# Patient Record
Sex: FEMALE | Race: WHITE | Employment: FULL TIME | URBAN - METROPOLITAN AREA
[De-identification: names, ages, dates, MRNs, and addresses within clinical notes are randomized per-mention and may not be internally consistent; named-entity substitution may affect disease eponyms.]

---

## 2017-02-08 RX ORDER — NORGESTIMATE AND ETHINYL ESTRADIOL 0.25-0.035
1 KIT ORAL DAILY
Qty: 1 PACKET | Refills: 3 | Status: SHIPPED | OUTPATIENT
Start: 2017-02-08 | End: 2017-02-09 | Stop reason: ALTCHOICE

## 2017-02-09 RX ORDER — NORGESTIMATE AND ETHINYL ESTRADIOL 7DAYSX3 28
1 KIT ORAL DAILY
Qty: 28 TABLET | Refills: 3 | Status: SHIPPED | OUTPATIENT
Start: 2017-02-09 | End: 2017-04-05 | Stop reason: SDUPTHER

## 2017-04-05 ENCOUNTER — OFFICE VISIT (OUTPATIENT)
Dept: OBGYN CLINIC | Age: 37
End: 2017-04-05
Payer: COMMERCIAL

## 2017-04-05 VITALS
BODY MASS INDEX: 30.78 KG/M2 | RESPIRATION RATE: 18 BRPM | HEIGHT: 70 IN | HEART RATE: 82 BPM | WEIGHT: 215 LBS | DIASTOLIC BLOOD PRESSURE: 76 MMHG | SYSTOLIC BLOOD PRESSURE: 118 MMHG

## 2017-04-05 DIAGNOSIS — Z01.419 ENCOUNTER FOR GYNECOLOGICAL EXAMINATION (GENERAL) (ROUTINE) WITHOUT ABNORMAL FINDINGS: Primary | ICD-10-CM

## 2017-04-05 PROCEDURE — 99395 PREV VISIT EST AGE 18-39: CPT | Performed by: ADVANCED PRACTICE MIDWIFE

## 2017-04-05 RX ORDER — NORGESTIMATE AND ETHINYL ESTRADIOL 7DAYSX3 28
1 KIT ORAL DAILY
Qty: 28 TABLET | Refills: 12 | Status: SHIPPED | OUTPATIENT
Start: 2017-04-05 | End: 2018-04-06 | Stop reason: SDUPTHER

## 2017-04-05 ASSESSMENT — PATIENT HEALTH QUESTIONNAIRE - PHQ9
1. LITTLE INTEREST OR PLEASURE IN DOING THINGS: 0
SUM OF ALL RESPONSES TO PHQ9 QUESTIONS 1 & 2: 0
SUM OF ALL RESPONSES TO PHQ QUESTIONS 1-9: 0
2. FEELING DOWN, DEPRESSED OR HOPELESS: 0

## 2018-04-06 RX ORDER — NORGESTIMATE AND ETHINYL ESTRADIOL 7DAYSX3 28
KIT ORAL
Qty: 28 TABLET | Refills: 2 | Status: SHIPPED | OUTPATIENT
Start: 2018-04-06 | End: 2018-06-27 | Stop reason: SDUPTHER

## 2018-06-27 ENCOUNTER — OFFICE VISIT (OUTPATIENT)
Dept: OBGYN CLINIC | Age: 38
End: 2018-06-27
Payer: COMMERCIAL

## 2018-06-27 VITALS
HEIGHT: 70 IN | HEART RATE: 76 BPM | DIASTOLIC BLOOD PRESSURE: 70 MMHG | RESPIRATION RATE: 16 BRPM | BODY MASS INDEX: 29.35 KG/M2 | WEIGHT: 205 LBS | SYSTOLIC BLOOD PRESSURE: 114 MMHG

## 2018-06-27 DIAGNOSIS — Z01.419 WELL FEMALE EXAM WITH ROUTINE GYNECOLOGICAL EXAM: Primary | ICD-10-CM

## 2018-06-27 DIAGNOSIS — Z12.31 SCREENING MAMMOGRAM, ENCOUNTER FOR: ICD-10-CM

## 2018-06-27 PROCEDURE — 99395 PREV VISIT EST AGE 18-39: CPT | Performed by: ADVANCED PRACTICE MIDWIFE

## 2018-06-27 RX ORDER — NORGESTIMATE AND ETHINYL ESTRADIOL 7DAYSX3 28
KIT ORAL
Qty: 28 TABLET | Refills: 12 | Status: SHIPPED | OUTPATIENT
Start: 2018-06-27 | End: 2019-06-24 | Stop reason: SDUPTHER

## 2018-06-27 RX ORDER — VILAZODONE HYDROCHLORIDE 20 MG/1
TABLET ORAL
Refills: 1 | COMMUNITY
Start: 2018-05-23 | End: 2021-07-27

## 2018-06-27 ASSESSMENT — PATIENT HEALTH QUESTIONNAIRE - PHQ9
SUM OF ALL RESPONSES TO PHQ QUESTIONS 1-9: 0
1. LITTLE INTEREST OR PLEASURE IN DOING THINGS: 0
SUM OF ALL RESPONSES TO PHQ9 QUESTIONS 1 & 2: 0
2. FEELING DOWN, DEPRESSED OR HOPELESS: 0

## 2018-07-23 ENCOUNTER — HOSPITAL ENCOUNTER (OUTPATIENT)
Dept: WOMENS IMAGING | Age: 38
Discharge: HOME | End: 2018-07-25
Payer: COMMERCIAL

## 2018-07-23 DIAGNOSIS — Z12.31 SCREENING MAMMOGRAM, ENCOUNTER FOR: ICD-10-CM

## 2018-07-23 DIAGNOSIS — Z01.419 WELL FEMALE EXAM WITH ROUTINE GYNECOLOGICAL EXAM: ICD-10-CM

## 2018-07-23 PROCEDURE — 77067 SCR MAMMO BI INCL CAD: CPT

## 2019-06-24 RX ORDER — NORGESTIMATE AND ETHINYL ESTRADIOL 7DAYSX3 28
KIT ORAL
Qty: 28 TABLET | Refills: 3 | Status: SHIPPED | OUTPATIENT
Start: 2019-06-24 | End: 2019-09-19 | Stop reason: SDUPTHER

## 2019-06-24 NOTE — TELEPHONE ENCOUNTER
Pt has her annual in august , she needs 3 refills of bc til her appt.  Call into [facility]

## 2019-09-19 ENCOUNTER — OFFICE VISIT (OUTPATIENT)
Dept: OBGYN CLINIC | Age: 39
End: 2019-09-19
Payer: COMMERCIAL

## 2019-09-19 VITALS
DIASTOLIC BLOOD PRESSURE: 78 MMHG | HEART RATE: 80 BPM | SYSTOLIC BLOOD PRESSURE: 114 MMHG | HEIGHT: 70 IN | WEIGHT: 227 LBS | BODY MASS INDEX: 32.5 KG/M2

## 2019-09-19 DIAGNOSIS — Z12.31 ENCOUNTER FOR SCREENING MAMMOGRAM FOR BREAST CANCER: ICD-10-CM

## 2019-09-19 DIAGNOSIS — Z30.41 ENCOUNTER FOR BIRTH CONTROL PILLS MAINTENANCE: ICD-10-CM

## 2019-09-19 DIAGNOSIS — Z01.419 WELL WOMAN EXAM WITH ROUTINE GYNECOLOGICAL EXAM: Primary | ICD-10-CM

## 2019-09-19 PROCEDURE — 99396 PREV VISIT EST AGE 40-64: CPT | Performed by: CLINICAL NURSE SPECIALIST

## 2019-09-19 RX ORDER — NORGESTIMATE AND ETHINYL ESTRADIOL 7DAYSX3 28
KIT ORAL
Qty: 28 TABLET | Refills: 0 | Status: SHIPPED | OUTPATIENT
Start: 2019-09-19 | End: 2019-11-22 | Stop reason: SDUPTHER

## 2019-09-19 NOTE — PROGRESS NOTES
Grandmother         fibrocystic breasts, uterine fibroids    Cancer Maternal Grandmother         lymphoma    Diabetes Maternal Grandmother     Other Mother         fibroids, fibrocystic breasts, DM2 at 61    Thyroid Disease Mother      Social History     Socioeconomic History    Marital status:      Spouse name: Not on file    Number of children: Not on file    Years of education: Not on file    Highest education level: Not on file   Occupational History    Not on file   Social Needs    Financial resource strain: Not on file    Food insecurity:     Worry: Not on file     Inability: Not on file    Transportation needs:     Medical: Not on file     Non-medical: Not on file   Tobacco Use    Smoking status: Never Smoker    Smokeless tobacco: Never Used   Substance and Sexual Activity    Alcohol use: No     Alcohol/week: 0.0 standard drinks     Comment: occ    Drug use: No    Sexual activity: Yes     Partners: Male   Lifestyle    Physical activity:     Days per week: Not on file     Minutes per session: Not on file    Stress: Not on file   Relationships    Social connections:     Talks on phone: Not on file     Gets together: Not on file     Attends Episcopal service: Not on file     Active member of club or organization: Not on file     Attends meetings of clubs or organizations: Not on file     Relationship status: Not on file    Intimate partner violence:     Fear of current or ex partner: Not on file     Emotionally abused: Not on file     Physically abused: Not on file     Forced sexual activity: Not on file   Other Topics Concern    Not on file   Social History Narrative    Not on file       MEDICATIONS:  Current Outpatient Medications   Medication Sig Dispense Refill    Norgestim-Eth Estrad Triphasic 0.18/0.215/0.25 MG-35 MCG TABS TAKE 1 TABLET BY MOUTH ONE TIME A DAY 28 tablet 0    VILAZODONE HCL 20 MG Tablet TAKE 1 TABLET BY MOUTH ONE TIME A DAY WITH FOOD  1     No current facility-administered medications for this visit. ALLERGIES:  Allergies as of 09/19/2019 - Review Complete 09/19/2019   Allergen Reaction Noted    Bee venom Swelling 01/25/2016       Symptoms of decreased mood absent  Symptoms of anhedonia absent    **If either question is answered in a  positive fashion then complete the PHQ9 Scoring Evaluation and make the appropriate referral**      Immunization status: stated as current, but no records available. Gynecologic History:  Menarche: 13 yo  Menopause at [address] [place] yo     Patient's last menstrual period was 08/25/2019 (approximate). Sexually Active: Yes    STD History: No     Permanent Sterilization: No   Reversible Birth Control: Yes pill        Hormone Replacement Exposure: No      Genetic Qualified Family History of Breast, Ovarian , Colon or Uterine Cancer: No     If YES see scanned worksheet. Preventative Health Testing:    Health Maintenance:  Health Maintenance Due   Topic Date Due    Lipid screen  11/18/2018    Diabetes screen  11/18/2018    Flu vaccine (1) 09/01/2019       Date of Last Pap Smear: 1/4/16N/N  Abnormal Pap Smear History: no  Colposcopy History:   Date of Last Mammogram: Na  Date of Last Colonoscopy:   Date of Last Bone Density:      ________________________________________________________________________        REVIEW OF SYSTEMS:    Yes   A minimum of an eleven point review of systems was completed. Review Of Systems (11 point):  Constitutional: No fever, chills or malaise;  No weight change or fatigue  Head and Eyes: No vision, Headache, Dizziness or trauma in last 12 months  ENT ROS: No hearing, Tinnitis, sinus or taste problems  Hematological and Lymphatic ROS:No Lymphoma, Von Willebrand's, Hemophillia or Bleeding History  Psych ROS: No Depression, Homicidal thoughts,suicidal thoughts, or anxiety  Breast ROS: No prior breast abnormalities or lumps  Respiratory ROS: No SOB, Pneumoniae,Cough, or Pulmonary Embolism History  Cardiovascular ROS: No Chest Pain with Exertion, Palpitations, Syncope, Edema, Arrhythmia  Gastrointestinal ROS: No Indigestion, Heartburn, Nausea, vomiting, Diarrhea, Constipation,or Bowel Changes; No Bloody Stools or melena  Genito-Urinary ROS: No Dysuria, Hematuria or Nocturia. No Urinary Incontinence or Vaginal Discharge  Musculoskeletal ROS: No Arthralgia, Arthritis,Gout,Osteoporosis or Rheumatism  Neurological ROS: No CVA, Migraines, Epilepsy, Seizure Hx, or Limb Weakness  Dermatological ROS: No Rash, Itching, Hives, Mole Changes or Cancer                                                                                                                                                                                                                                  PHYSICAL Exam:     Constitutional:  Vitals:    09/19/19 1647   BP: 114/78   Site: Left Upper Arm   Position: Sitting   Cuff Size: Large Adult   Pulse: 80   Weight: 227 lb (103 kg)   Height: 5' 10\" (1.778 m)         General Appearance: This  is a well Developed, well Nourished, well groomed female. Her BMI was reviewed. Nutritional decision making was discussed. Skin:  There was a Normal Inspection of the skin without rashes or lesions. There were no rashes. (Papular, Maculopapular, Hives, Pustular, Macular)     There were no lesions (Ulcers, Erythema, Abn. Appearing Nevi)            Lymphatic:  No Lymph Nodes were Palpable in the neck , axilla or groin.  0 # Of Lymph Nodes; Location ; Character [Normal]  [Shotty] [Tender] [Enlarged]     Neck and EENT:  The neck was supple. There were no masses   The thyroid was not enlarged and had no masses. Perrla, EOMI B/L, TMI B/L No Abnormalities. Throat inspected-No exudates or Masses, Nares Patent No Masses        Respiratory: The lungs were auscultated and found to be clear. There were no rales, rhonchi or wheezes. There was a good respiratory effort. Cardiovascular:   The heart was in a

## 2019-11-22 DIAGNOSIS — Z30.41 ENCOUNTER FOR BIRTH CONTROL PILLS MAINTENANCE: ICD-10-CM

## 2019-11-22 RX ORDER — NORGESTIMATE AND ETHINYL ESTRADIOL 7DAYSX3 28
KIT ORAL
Qty: 28 TABLET | Refills: 3 | Status: SHIPPED | OUTPATIENT
Start: 2019-11-22 | End: 2021-07-27

## 2021-05-26 ENCOUNTER — HOSPITAL ENCOUNTER (OUTPATIENT)
Dept: MRI IMAGING | Facility: CLINIC | Age: 41
Discharge: HOME | End: 2021-05-28
Payer: COMMERCIAL

## 2021-05-26 DIAGNOSIS — R94.120: ICD-10-CM

## 2021-05-26 DIAGNOSIS — R42 DIZZINESS: ICD-10-CM

## 2021-05-26 PROCEDURE — 70553 MRI BRAIN STEM W/O & W/DYE: CPT

## 2021-05-26 PROCEDURE — 6360000004 HC RX CONTRAST MEDICATION: Performed by: NURSE PRACTITIONER

## 2021-05-26 PROCEDURE — A9579 GAD-BASE MR CONTRAST NOS,1ML: HCPCS | Performed by: NURSE PRACTITIONER

## 2021-05-26 PROCEDURE — 2580000003 HC RX 258: Performed by: NURSE PRACTITIONER

## 2021-05-26 RX ORDER — SODIUM CHLORIDE 0.9 % (FLUSH) 0.9 %
10 SYRINGE (ML) INJECTION PRN
Status: DISCONTINUED | OUTPATIENT
Start: 2021-05-26 | End: 2021-05-29 | Stop reason: HOSPADM

## 2021-05-26 RX ADMIN — SODIUM CHLORIDE, PRESERVATIVE FREE 10 ML: 5 INJECTION INTRAVENOUS at 10:07

## 2021-05-26 RX ADMIN — GADOTERIDOL 20 ML: 279.3 INJECTION, SOLUTION INTRAVENOUS at 10:02

## 2021-07-27 ENCOUNTER — HOSPITAL ENCOUNTER (OUTPATIENT)
Age: 41
Setting detail: SPECIMEN
Discharge: HOME | End: 2021-07-27
Payer: COMMERCIAL

## 2021-07-27 ENCOUNTER — OFFICE VISIT (OUTPATIENT)
Dept: OBGYN CLINIC | Age: 41
End: 2021-07-27
Payer: COMMERCIAL

## 2021-07-27 VITALS
DIASTOLIC BLOOD PRESSURE: 66 MMHG | SYSTOLIC BLOOD PRESSURE: 106 MMHG | HEIGHT: 70 IN | BODY MASS INDEX: 31.5 KG/M2 | WEIGHT: 220 LBS

## 2021-07-27 DIAGNOSIS — Z01.419 WELL FEMALE EXAM WITH ROUTINE GYNECOLOGICAL EXAM: Primary | ICD-10-CM

## 2021-07-27 DIAGNOSIS — Z11.51 SPECIAL SCREENING EXAMINATION FOR HUMAN PAPILLOMAVIRUS (HPV): ICD-10-CM

## 2021-07-27 DIAGNOSIS — Z12.31 ENCOUNTER FOR SCREENING MAMMOGRAM FOR MALIGNANT NEOPLASM OF BREAST: ICD-10-CM

## 2021-07-27 DIAGNOSIS — Z01.419 WELL FEMALE EXAM WITH ROUTINE GYNECOLOGICAL EXAM: ICD-10-CM

## 2021-07-27 PROCEDURE — G0145 SCR C/V CYTO,THINLAYER,RESCR: HCPCS

## 2021-07-27 PROCEDURE — 87624 HPV HI-RISK TYP POOLED RSLT: CPT

## 2021-07-27 PROCEDURE — 99396 PREV VISIT EST AGE 40-64: CPT | Performed by: NURSE PRACTITIONER

## 2021-07-27 ASSESSMENT — PATIENT HEALTH QUESTIONNAIRE - PHQ9
SUM OF ALL RESPONSES TO PHQ QUESTIONS 1-9: 0
SUM OF ALL RESPONSES TO PHQ QUESTIONS 1-9: 0
1. LITTLE INTEREST OR PLEASURE IN DOING THINGS: 0
SUM OF ALL RESPONSES TO PHQ QUESTIONS 1-9: 0
2. FEELING DOWN, DEPRESSED OR HOPELESS: 0
SUM OF ALL RESPONSES TO PHQ9 QUESTIONS 1 & 2: 0

## 2021-07-27 NOTE — PROGRESS NOTES
History and Physical  [address], [address] [facility] [address] [phone]   Fax [phone]  Leno Stanley  2023              41 y.o. Chief Complaint   Patient presents with    Annual Exam       Patient's last menstrual period was 2021. Primary Care Physician: Keny Mims MD    The patient was seen and examined. She has no chief complaint today and is here for her annual exam.  Her bowels are regular. There are no voiding complaints. She denies any bloating. She denies vaginal discharge and was counseled on STD's and the need for barrier contraception.      HPI : Leno Stanley is a 41 y.o. female     Annual exam  No chief complaint  ________________________________________________________________________  OB History    Para Term  AB Living   2 2 2 0 0 2   SAB TAB Ectopic Molar Multiple Live Births   0 0 0 0 0 2      # Outcome Date GA Lbr [place]/2nd Weight Sex Delivery Anes PTL Lv   2 Term 16 38w0d  8 lb 5.7 oz (3.79 kg) F CS-LTranv Spinal N JOSSUE      Name: Altamease Reasons: 8  Apgar5: 9   1 Term 11 39w2d  7 lb 1 oz (3.204 kg) F Vag-Spont EPI  JOSSUE      Name: Maureen Biswas     Past Medical History:   Diagnosis Date    History of low transverse  section     Postpartum depression     Varicose veins                                                                    Past Surgical History:   Procedure Laterality Date     SECTION  2016    SEPTOPLASTY      WISDOM TOOTH EXTRACTION       Family History   Problem Relation Age of Onset    Heart Attack Paternal Grandfather         MI   [de-identified] Lupus Paternal Grandmother     Osteoporosis Paternal Grandmother         fibrocystic breasts, uterine fibroids    Cancer Maternal Grandmother         lymphoma    Diabetes Maternal Grandmother     Other Mother         fibroids, fibrocystic breasts, DM2 at 61    Thyroid Disease Mother Social History     Socioeconomic History    Marital status:      Spouse name: Not on file    Number of children: Not on file    Years of education: Not on file    Highest education level: Not on file   Occupational History    Not on file   Tobacco Use    Smoking status: Never Smoker    Smokeless tobacco: Never Used   Vaping Use    Vaping Use: Never used   Substance and Sexual Activity    Alcohol use: No     Alcohol/week: 0.0 standard drinks     Comment: occ    Drug use: No    Sexual activity: Yes     Partners: Male   Other Topics Concern    Not on file   Social History Narrative    Not on file     Social Determinants of Health     Financial Resource Strain:     Difficulty of Paying Living Expenses:    Food Insecurity:     Worried About Running Out of Food in the Last Year:     [address] in the Last Year:    Transportation Needs:     Lack of Transportation (Medical):  Lack of Transportation (Non-Medical):    Physical Activity:     Days of Exercise per Week:     Minutes of Exercise per Session:    Stress:     Feeling of Stress :    Social Connections:     Frequency of Communication with Friends and Family:     Frequency of Social Gatherings with Friends and Family:     Attends Congregational Services:     Active Member of Clubs or Organizations:     Attends Club or Organization Meetings:     Marital Status:    Intimate Partner Violence:     Fear of Current or Ex-Partner:     Emotionally Abused:     Physically Abused:     Sexually Abused:        MEDICATIONS:  No current outpatient medications on file. No current facility-administered medications for this visit.            ALLERGIES:  Allergies as of 07/27/2021 - Fully Reviewed 07/27/2021   Allergen Reaction Noted    Bee venom Swelling 01/25/2016       Symptoms of decreased mood absent  Symptoms of anhedonia absent    **If either question is answered in a  positive fashion then complete the PHQ9 Scoring Evaluation and make the appropriate referral**      Immunization status: stated as current, but no records available. Gynecologic History:  Menarche: 13 yo  Menopause at [address] [address] [place] yo     Patient's last menstrual period was 07/05/2021. Sexually Active: Yes    STD History: No     Permanent Sterilization: No  with vasectomy  Reversible Birth Control: No        Hormone Replacement Exposure: No      Genetic Qualified Family History of Breast, Ovarian , Colon or Uterine Cancer: No     If YES see scanned worksheet. Preventative Health Testing:    Health Maintenance:  Health Maintenance Due   Topic Date Due    Hepatitis C screen  Never done    Lipid screen  Never done    Diabetes screen  11/18/2018    Cervical cancer screen  01/04/2021       Date of Last Pap Smear: 12/028/2015 neg/neg  Abnormal Pap Smear History: denies  Colposcopy History:   Date of Last Mammogram: 7/23/2018 normal  Date of Last Colonoscopy:   Date of Last Bone Density:      ________________________________________________________________________        REVIEW OF SYSTEMS:    yes   A minimum of an eleven point review of systems was completed. Review Of Systems (11 point):  Constitutional: No fever, chills or malaise; No weight change or fatigue  Head and Eyes: No vision, Headache, Dizziness or trauma in last 12 months  ENT ROS: No hearing, Tinnitis, sinus or taste problems  Hematological and Lymphatic ROS:No Lymphoma, Von Willebrand's, Hemophillia or Bleeding History  Psych ROS: No Depression, Homicidal thoughts,suicidal thoughts, or anxiety  Breast ROS: No prior breast abnormalities or lumps  Respiratory ROS: No SOB, Pneumoniae,Cough, or Pulmonary Embolism History  Cardiovascular ROS: No Chest Pain with Exertion, Palpitations, Syncope, Edema, Arrhythmia  Gastrointestinal ROS: No Indigestion, Heartburn, Nausea, vomiting, Diarrhea, Constipation,or Bowel Changes; No Bloody Stools or melena  Genito-Urinary ROS: No Dysuria, Hematuria or Nocturia.  No Urinary Incontinence or Vaginal Discharge  Musculoskeletal ROS: No Arthralgia, Arthritis,Gout,Osteoporosis or Rheumatism  Neurological ROS: No CVA, Migraines, Epilepsy, Seizure Hx, or Limb Weakness  Dermatological ROS: No Rash, Itching, Hives, Mole Changes or Cancer                                                                                                                                                                                                                                  PHYSICAL Exam:     Constitutional:  Vitals:    07/27/21 1015   BP: 106/66   Site: Right Upper Arm   Position: Sitting   Cuff Size: Large Adult   Weight: 220 lb (99.8 kg)   Height: 5' 10\" (1.778 m)       Chaperone for Intimate Exam   Chaperone was offered and accepted as part of the rooming process.  Chaperone: April          General Appearance: This  is a well Developed, well Nourished, well groomed female. Her BMI was reviewed. Nutritional decision making was discussed. Skin:  There was a Normal Inspection of the skin without rashes or lesions. There were no rashes. (Papular, Maculopapular, Hives, Pustular, Macular)     There were no lesions (Ulcers, Erythema, Abn. Appearing Nevi)            Lymphatic:  No Lymph Nodes were Palpable in the neck , axilla or groin.  0 # Of Lymph Nodes; Location ; Character [Normal]  [Shotty] [Tender] [Enlarged]     Neck and EENT:  The neck was supple. There were no masses   The thyroid was not enlarged and had no masses. Perrla, EOMI B/L, TMI B/L No Abnormalities. Throat inspected-No exudates or Masses, Nares Patent No Masses        Respiratory: The lungs were auscultated and found to be clear. There were no rales, rhonchi or wheezes. There was a good respiratory effort. Cardiovascular: The heart was in a regular rate and rhythm. . No S3 or S4. There was no murmur appreciated. Location, grade, and radiation are not applicable. Extremities:   The patients extremities were without calf tenderness, edema, or varicosities. There was full range of motion in all four extremities. Pulses in all four extremities were appreciated and are 2/4. Abdomen: The abdomen was soft and non-tender. There were good bowel sounds in all quadrants and there was no guarding, rebound or rigidity. On evaluation there was no evidence of hepatosplenomegaly and there was no costal vertebral kamilla tenderness bilaterally. No hernias were appreciated. Abdominal Scars: C/S scar    Psych: The patient had a normal Orientation to: Time, Place, Person, and Situation  There is no Mood / Affect changes    Breast:  (Chest)  normal appearance, no masses or tenderness  Self breast exams were reviewed in detail. Literature was given. Pelvic Exam:  Vulva and vagina appear normal. Bimanual exam reveals normal uterus and adnexa. Rectal Exam:  exam declined by patient          Musculosk:  Normal Gait and station was noted. Digits were evaluated without abnormal findings. Range of motion, stability and strength were evaluated and found to be appropriate for the patients age. ASSESSMENT:      41 y.o. Annual   Diagnosis Orders   1. Well female exam with routine gynecological exam  PAP SMEAR   2. Encounter for screening mammogram for malignant neoplasm of breast  MELISSA DIGITAL SCREEN W OR WO CAD BILATERAL   3. Special screening examination for human papillomavirus (HPV)  PAP SMEAR          Chief Complaint   Patient presents with    Annual Exam          Past Medical History:   Diagnosis Date    History of low transverse  section     Postpartum depression     Varicose veins          Patient Active Problem List    Diagnosis Date Noted    Rh+/ RI/ GBS+ 2015     Priority: High     PRENATAL LAB RESULTS:   Blood Type/Rh: B pos  Antibody Screen: negative  Hemoglobin, Hematocrit, Platelets: 03.9  / 44.4 / 253  Rubella: immune  T.  Pallidum, IgG: non-reactive   Hepatitis B Surface Antigen: negative   HIV: non-reactive   Sickle Cell Screen: not done  Gonorrhea: negative  Chlamydia: negative  Urine culture: negative, date: 16    1 hour Glucose Tolerance Test: 145     Glucose Fastin  1 hour Glucose Tolerance Test: 119  2 hour Glucose Tolerance Test: 103  3 hour Glucose Tolerance Test: 114    Group B Strep:  positive    Cystic Fibrosis Screen:  negative  First Trimester Screen:  elv free bhCG  NIPT- normal  MSAFP/Multiple Markers:  normal  Anatomy US: Anterior placenta, complete previa, female       History of low transverse  section     PLTCS 14 F Ap/9 Wt: 8#6 2016    Born by  section      Replacing Deprecated Diagnoses            Hereditary Breast, Ovarian, Colon and Uterine Cancer screening Done. Tobacco & Secondary smoke risks reviewed; instructed on cessation and avoidance      Counseling Completed:  Preventative Health Recommendations and Follow up. The patient was informed of the recommended preventative health recommendations. 1. Annuals every year; Cytology collections per prevailing guidelines. 2. Mammograms begin every year at 37 yo if no abnormalities are found and no family history. 3. Bone density studies every 2-3 years. Begin at 73 yo. If no fracture history or osteoporosis family history. (significant). 4. Colonoscopy begin at 40 yo. Repeat every ten years if negative and no family history. 5. Calcium of 9966-1821 mg/day in split dosing  6. Vitamin D 400-800 IU/day  7. All other preventative health recommendations will be managed by the patients Primary care physician. PLAN:  Return in about 1 year (around 2022) for annual.   Pap smear obtained. Mammogram ordered. Repeat Annual every 1 year  Cervical Cytology Evaluation begins at 24years old. If Negative Cytology, Follow-up screening per current guidelines. Mammograms every 1 year. If 37 yo and last mammogram was negative.   Calcium and Vitamin D dosing reviewed. Colonoscopy screening reviewed as well as onset for bone density testing. Birth control and barrier recommendations discussed. STD counseling and prevention reviewed. Gardisil counseling completed for all patients 10-35 yo. Routine health maintenance per patients PCP. Orders Placed This Encounter   Procedures    MELISSA DIGITAL SCREEN W OR WO CAD BILATERAL     Standing Status:   Future     Standing Expiration Date:   2022     Order Specific Question:   Reason for exam:     Answer:   screening mammogram    PAP SMEAR     Patient History:    No LMP recorded. OBGYN Status: Having periods  Past Surgical History:  2016:  SECTION  No date: SEPTOPLASTY  No date: WISDOM TOOTH EXTRACTION  Medications/Contraceptives Affecting Cytology     Combination Contraceptives - Oral Disp Start End     Norgestim-Eth Estrad Triphasic 0.18/0.215/0.25 MG-35 MCG TABS    28 tablet 2019     Sig: TAKE 1 TABLET BY MOUTH ONE TIME A DAY    Renewals     Renewal provider: ARLEEN Meraz CNM           Social History    Tobacco Use      Smoking status: Never Smoker      Smokeless tobacco: Never Used       Standing Status:   Future     Standing Expiration Date:   2022     Order Specific Question:   Collection Type     Answer: Thin Prep     Order Specific Question:   Prior Abnormal Pap Test     Answer:   No     Order Specific Question:   Screening or Diagnostic     Answer:   Screening     Order Specific Question:   HPV Requested? Answer:   Yes     Order Specific Question:   High Risk Patient     Answer:   N/A           The patient, Juan J Lacey is a 41 y.o. female, was seen with a total time spent of 20 minutes for the visit on this date of service by the E/M provider. The time component had both face to face and non face to face time spent in determining the total time component.   Counseling and education regarding her diagnosis listed below and her options regarding those diagnoses were also included in determining her time component. Diagnosis Orders   1. Well female exam with routine gynecological exam  PAP SMEAR   2. Encounter for screening mammogram for malignant neoplasm of breast  MELISSA DIGITAL SCREEN W OR WO CAD BILATERAL   3. Special screening examination for human papillomavirus (HPV)  PAP SMEAR        The patient had her preventative health maintenance recommendations and follow-up reviewed with her at the completion of her visit.

## 2021-07-28 LAB
HPV SAMPLE: NORMAL
HPV, GENOTYPE 16: NOT DETECTED
HPV, GENOTYPE 18: NOT DETECTED
HPV, HIGH RISK OTHER: NOT DETECTED
HPV, INTERPRETATION: NORMAL
SPECIMEN DESCRIPTION: NORMAL

## 2021-08-02 LAB — CYTOLOGY REPORT: NORMAL

## 2021-08-03 ENCOUNTER — OFFICE VISIT (OUTPATIENT)
Dept: NEUROLOGY | Age: 41
End: 2021-08-03
Payer: COMMERCIAL

## 2021-08-03 VITALS
HEART RATE: 61 BPM | BODY MASS INDEX: 31.35 KG/M2 | HEIGHT: 70 IN | WEIGHT: 219 LBS | SYSTOLIC BLOOD PRESSURE: 127 MMHG | DIASTOLIC BLOOD PRESSURE: 75 MMHG

## 2021-08-03 DIAGNOSIS — R93.89 ABNORMAL MRI: Primary | ICD-10-CM

## 2021-08-03 PROCEDURE — 99204 OFFICE O/P NEW MOD 45 MIN: CPT | Performed by: PSYCHIATRY & NEUROLOGY

## 2021-08-03 RX ORDER — VERAPAMIL HYDROCHLORIDE 80 MG/1
80 TABLET ORAL NIGHTLY
Qty: 30 TABLET | Refills: 2 | Status: SHIPPED | OUTPATIENT
Start: 2021-08-03 | End: 2021-11-30

## 2021-08-03 RX ORDER — CETIRIZINE HYDROCHLORIDE 10 MG/1
10 TABLET ORAL DAILY
COMMUNITY
End: 2022-07-27

## 2021-08-03 NOTE — PROGRESS NOTES
[facility], [address], [address] [place] [address]  Ph: [phone] or [phone]  FAX: [phone]    Chief Complaint: Dizziness and Headaches     Dear Jacinto Singh MD     I had the pleasure of seeing your patient today in neurology consultation for her symptoms. As you would recall Leigh Ann Mcdaniel is a 41 y.o. female. Patient reports to the office following a 2021 MRI scan of the brain that showed scattered FLAIR signal abnormalities. This MRI was completed after the patient experienced dizziness, brain fog, and headaches. Compared to a previous MRI scan in 2006, there are a greater number of FLAIR abnormalities. Patient reports a history of headaches. She admits to 3-4 headaches weekly but has never taken a prophylactic medication. She does take Tylenol, however. She admits to intermitte tingling of the hands and feet. Additionally, the patient admits to the occasional soreness on her breastbone. Other reported symptoms include blue flashes, arthritis pain, fatigue, ringing in the ears, and a facial rash that is sensitive to the sun. The patient reports a family history of lupus. MRI Brain W WO Contrast on 2021: Scattered FLAIR signal abnormalities in the subcortical and periventricular white matter that are nonspecific and differential considerations include a post infectious/inflammatory process, vasculitis, or a demyelinating process. These can also be seen with chronic headaches.  There is no abnormal postcontrast enhancement.     Past Medical History:   Diagnosis Date    History of low transverse  section     Postpartum depression 2011    Varicose veins      Past Surgical History:   Procedure Laterality Date     SECTION  2016    SEPTOPLASTY      WISDOM TOOTH EXTRACTION       Allergies   Allergen Reactions    Bee Venom Swelling     Family History   Problem Relation Age of Onset    Heart Attack Paternal Grandfather MI    Lupus Paternal Grandmother     Osteoporosis Paternal Grandmother         fibrocystic breasts, uterine fibroids    Cancer Maternal Grandmother         lymphoma    Diabetes Maternal Grandmother     Other Mother         fibroids, fibrocystic breasts, DM2 at 61    Thyroid Disease Mother       Social History     Socioeconomic History    Marital status:      Spouse name: Not on file    Number of children: Not on file    Years of education: Not on file    Highest education level: Not on file   Occupational History    Not on file   Tobacco Use    Smoking status: Never Smoker    Smokeless tobacco: Never Used   Vaping Use    Vaping Use: Never used   Substance and Sexual Activity    Alcohol use: No     Alcohol/week: 0.0 standard drinks     Comment: occ    Drug use: No    Sexual activity: Yes     Partners: Male   Other Topics Concern    Not on file   Social History Narrative    Not on file     Social Determinants of Health     Financial Resource Strain:     Difficulty of Paying Living Expenses:    Food Insecurity:     Worried About Running Out of Food in the Last Year:     [address] in the Last Year:    Transportation Needs:     Lack of Transportation (Medical):      Lack of Transportation (Non-Medical):    Physical Activity:     Days of Exercise per Week:     Minutes of Exercise per Session:    Stress:     Feeling of Stress :    Social Connections:     Frequency of Communication with Friends and Family:     Frequency of Social Gatherings with Friends and Family:     Attends Voodoo Services:     Active Member of Clubs or Organizations:     Attends Club or Organization Meetings:     Marital Status:    Intimate Partner Violence:     Fear of Current or Ex-Partner:     Emotionally Abused:     Physically Abused:     Sexually Abused:       LMP 07/05/2021      HEENT [] Hearing Loss  [] Visual Disturbance  [] Tinnitus  [] Eye pain   Respiratory [] Shortness of Breath  [] Cough  [] Snoring   Cardiovascular [] Chest Pain  [] Palpitations  [] Lightheaded   GI [] Constipation  [] Diarrhea  [] Swallowing change  [] Nausea/vomiting    [] Urinary Frequency  [] Urinary Urgency   Musculoskeletal [] Neck pain  [] Back pain  [] Muscle pain  [] Restless legs   Dermatologic [] Skin changes   Neurologic [] Memory loss/confusion  [] Seizures  [] Trouble walking or imbalance  [x] Dizziness  [] Sleep disturbance  [] Weakness  [x] Numbness  [] Tremors  [] Speech Difficulty  [x] Headaches  [] Light Sensitivity  [] Sound Sensitivity   Endocrinology []Excessive thirst  []Excessive hunger   Psychiatric [] Anxiety/Depression  [] Hallucination   Allergy/immunology []Hives/environmental allergies   Hematologic/lymph [] Abnormal bleeding  [] Abnormal bruising       General appearence: Normal. Well groomed.  In no acute distress    Head: Normocephalic, atraumatic  Eyes: Extraocular movements intact, eye lids normal  Lungs: Respirations unlabored, chest wall no deformity  ENT: Normal external ear canals, no sinus tenderness  Heart: Regular rate rhythm  Abdomen: No masses, tenderness  Extremities: No cyanosis or edema, 2+ pulses  Musculoskeletal: Normal range of motion in all joints  Skin: Intact, normal skin color    Neurological examination:    Mental status   Alert and oriented; intact memory with no confusion, speech or language problems; no hallucinations or delusions     Cranial nerves   II - visual fields intact to confrontation                                                III, IV, VI - extra-ocular muscles full: no pupillary defect; no ROSARIO, no nystagmus, no ptosis   V - normal facial sensation                                                               VII - normal facial symmetry                                                             VIII - intact hearing                                                                             IX, X - symmetrical palate XI - symmetrical shoulder shrug                                                       XII - midline tongue without atrophy or fasciculation     Motor function  Normal muscle bulk and tone; normal power 5/5, including fine motor movements     Sensory function Intact to touch, pin, vibration, proprioception     Cerebellar Intact fine motor movement. No involuntary movements or tremors     Reflex function Intact 2+ DTR and symmetric. Negative Babinski     Gait                  Normal station and gait       No results found for: LDLCALC, LDLCHOLESTEROL, LDLDIRECT  No components found for: CHLPL  No results found for: TRIG  No results found for: HDL  No results found for: LDLCALC  No results found for: LABVLDL  Lab Results   Component Value Date    LABA1C 5.0 05/28/2016     Lab Results   Component Value Date    EAG 97 05/28/2016     No results found for: Evelyn Daniel   Neurological work up:  CT head  CTA head and neck  MRI brain 05/2021    2 D echo     All of patient's labs were personally reviewed. All the imaging studies were personally reviewed and discussed with the patient. Assessment Recommendations:  Abnormal MRI scan of the brain with white matter changes. The differential diagnosis includes changes secondary to migraine, a possibility of inflammatory/demyelinating etiology cannot be ruled out. I recommend the patient complete lab testing of STEPHENIE and ESR levels for possible blood vessel inflammation. Additionally, completing a CT angiogram of the head and neck will be able to rule out vasculitis. Additionally, I will check for Anti-DNA, HIV, Lyme disease, Rheumatoid, and Sjogren's syndrome lab test in attempt to identify cause of MRI changes. For now, I recommend the patient continue MRI scans of the brain in next 12 months for surveillance. I discussed with the patient the option to pursue a spinal tap in the future, but I prefer waiting until lab workup is completed.      I recommend the patient initiate Verapamil 80 mg nightly as a prophylactic headache medication. All medication side effects and questions were answered. Follow up in 4-6 weeks or sooner if symptoms worsen. Amor Soto MD I would like to thank you for the consult. Please do not hesitate if you have any questions about the patient care. This note is created with the assistance of a speech-recognition program. While intending to generate a document that actually reflects the content of the visit, the document can still have some errors including those of syntax and sound a- like substitutions which may escape proofreading. In such instances, actual meaning can be extrapolated by contextual derivation. Scribe Attestation:   By signing my name below, Pili Webster, attest that this documentation has been prepared under the direction and in the presence of Lita Childers MD.    Electronically Signed: Ronnie Madera. 8/3/2021 1:06 PM    I, Gila Sanchez MD, personally performed the services described in this documentation. All medical record entries made by the scribe were at my direction and in my presence. I have reviewed the chart and discharge instructions (if applicable) and agree that the record reflects my personal performance and is accurate and complete.     Electronically Signed: Gila Sanchez 8/3/2021 1:06 PM    Diplomate, American Board of Psychiatry and Neurology  Diplomate, American Board of Clinical Neurophysiology  Diplomate, American Board of Epilepsy

## 2021-08-10 ENCOUNTER — HOSPITAL ENCOUNTER (OUTPATIENT)
Age: 41
Discharge: HOME | End: 2021-08-10
Payer: COMMERCIAL

## 2021-08-10 DIAGNOSIS — R93.89 ABNORMAL MRI: ICD-10-CM

## 2021-08-10 LAB
HIV AG/AB: NONREACTIVE
RHEUMATOID FACTOR: <10 IU/ML
SEDIMENTATION RATE, ERYTHROCYTE: 12 MM (ref 0–20)

## 2021-08-10 PROCEDURE — 86431 RHEUMATOID FACTOR QUANT: CPT

## 2021-08-10 PROCEDURE — 87389 HIV-1 AG W/HIV-1&-2 AB AG IA: CPT

## 2021-08-10 PROCEDURE — 86225 DNA ANTIBODY NATIVE: CPT

## 2021-08-10 PROCEDURE — 86235 NUCLEAR ANTIGEN ANTIBODY: CPT

## 2021-08-10 PROCEDURE — 86038 ANTINUCLEAR ANTIBODIES: CPT

## 2021-08-10 PROCEDURE — 86618 LYME DISEASE ANTIBODY: CPT

## 2021-08-10 PROCEDURE — 85652 RBC SED RATE AUTOMATED: CPT

## 2021-08-10 PROCEDURE — 36415 COLL VENOUS BLD VENIPUNCTURE: CPT

## 2021-08-11 LAB
ANTI DNA DOUBLE STRANDED: 2.4 IU/ML
ANTI SSA: <0.3 U/ML
ANTI-NUCLEAR ANTIBODY (ANA): NEGATIVE
ENA ANTIBODIES SCREEN: 0.3 U/ML

## 2021-08-12 LAB — LYME ANTIBODY: 0.25

## 2021-08-14 ENCOUNTER — HOSPITAL ENCOUNTER (OUTPATIENT)
Dept: CT IMAGING | Age: 41
Discharge: HOME | End: 2021-08-16
Payer: COMMERCIAL

## 2021-08-14 DIAGNOSIS — R93.89 ABNORMAL MRI: ICD-10-CM

## 2021-08-14 PROCEDURE — 70496 CT ANGIOGRAPHY HEAD: CPT

## 2021-08-14 PROCEDURE — 2580000003 HC RX 258: Performed by: PSYCHIATRY & NEUROLOGY

## 2021-08-14 PROCEDURE — 70450 CT HEAD/BRAIN W/O DYE: CPT

## 2021-08-14 PROCEDURE — 6360000004 HC RX CONTRAST MEDICATION: Performed by: PSYCHIATRY & NEUROLOGY

## 2021-08-14 RX ORDER — 0.9 % SODIUM CHLORIDE 0.9 %
80 INTRAVENOUS SOLUTION INTRAVENOUS ONCE
Status: COMPLETED | OUTPATIENT
Start: 2021-08-14 | End: 2021-08-14

## 2021-08-14 RX ORDER — SODIUM CHLORIDE 0.9 % (FLUSH) 0.9 %
10 SYRINGE (ML) INJECTION PRN
Status: DISCONTINUED | OUTPATIENT
Start: 2021-08-14 | End: 2021-08-17 | Stop reason: HOSPADM

## 2021-08-14 RX ADMIN — SODIUM CHLORIDE 80 ML: 9 INJECTION, SOLUTION INTRAVENOUS at 10:08

## 2021-08-14 RX ADMIN — SODIUM CHLORIDE, PRESERVATIVE FREE 10 ML: 5 INJECTION INTRAVENOUS at 10:08

## 2021-08-14 RX ADMIN — IOPAMIDOL 75 ML: 755 INJECTION, SOLUTION INTRAVENOUS at 10:07

## 2021-08-19 ENCOUNTER — HOSPITAL ENCOUNTER (OUTPATIENT)
Dept: WOMENS IMAGING | Age: 41
Discharge: HOME | End: 2021-08-21
Payer: COMMERCIAL

## 2021-08-19 DIAGNOSIS — Z12.31 ENCOUNTER FOR SCREENING MAMMOGRAM FOR MALIGNANT NEOPLASM OF BREAST: ICD-10-CM

## 2021-08-19 PROCEDURE — 77063 BREAST TOMOSYNTHESIS BI: CPT

## 2021-08-23 NOTE — TELEPHONE ENCOUNTER
[facility] called in. She received Dr. Rodriguez Distance message that the medication could be changed if she felt the side effects were significant. She said in regards to the swelling that she wears compression stockings and her feet and toes are still  pretty swollen. She said the verapamil seems to  help with the  brain fog but she still getting bouts of dizziness. She is thinking maybe she should try a different medication if Dr. Olga Lidia Castellon is agreeable.

## 2021-08-24 NOTE — TELEPHONE ENCOUNTER
The other medication options can be propanolol 20 mg twice a day and patient can stop verapamil. If he has any side effects from the medication, please advise her to contact.   Thank you

## 2021-08-26 RX ORDER — PROPRANOLOL HYDROCHLORIDE 20 MG/1
20 TABLET ORAL 2 TIMES DAILY
Qty: 60 TABLET | Refills: 2 | Status: SHIPPED | OUTPATIENT
Start: 2021-08-26 | End: 2021-11-30

## 2021-10-14 ENCOUNTER — OFFICE VISIT (OUTPATIENT)
Dept: NEUROLOGY | Age: 41
End: 2021-10-14
Payer: COMMERCIAL

## 2021-10-14 VITALS
SYSTOLIC BLOOD PRESSURE: 111 MMHG | HEART RATE: 54 BPM | HEIGHT: 70 IN | DIASTOLIC BLOOD PRESSURE: 70 MMHG | WEIGHT: 215 LBS | BODY MASS INDEX: 30.78 KG/M2

## 2021-10-14 DIAGNOSIS — G37.9 DEMYELINATING DISEASE (HCC): Primary | ICD-10-CM

## 2021-10-14 PROCEDURE — 99214 OFFICE O/P EST MOD 30 MIN: CPT | Performed by: PSYCHIATRY & NEUROLOGY

## 2021-10-14 RX ORDER — TOPIRAMATE 25 MG/1
TABLET ORAL
Qty: 60 TABLET | Refills: 3 | Status: SHIPPED | OUTPATIENT
Start: 2021-10-14 | End: 2022-02-28

## 2021-10-14 NOTE — PROGRESS NOTES
[facility], [address], [address] [place] [address]  Ph: [phone] or [phone]  FAX: [phone]    Chief Complaint: Dizziness and Headaches     Dear Morena Alamo MD     I had the pleasure of seeing your patient today in neurology consultation for her symptoms. As you would recall Rosamaria Chow is a 41 y.o. female. Patient reports to the office following a 05/26/2021 MRI scan of the brain that showed scattered FLAIR signal abnormalities. This MRI was completed after the patient experienced dizziness, brain fog, and headaches. Compared to a previous MRI scan in 2006, there are a greater number of FLAIR abnormalities. Patient reports a history of headaches. She admits to 3-4 headaches weekly but has never taken a prophylactic medication. She does take Tylenol, however. She admits to intermitte tingling of the hands and feet. Additionally, the patient admits to the occasional soreness on her breastbone. Other reported symptoms include blue flashes, arthritis pain, fatigue, ringing in the ears, and a facial rash that is sensitive to the sun. The patient reports a family history of lupus. Today, the patient reports medication compliance with Propanolol 20 mg BID. She admits to slight swelling of bilateral ankles but has been wearing compression socks to alleviate swelling. Patient states her brain fog has improved but dizziness remains at baseline. She suspects brain fog improved upon onset of Propanolol. Upon onset of dizziness, patient feels she may pass out and admits to feeling off balance. Patient continues to experiences headaches, but are less frequent than before. She has noticed headaches tend to occur in the morning upon waking. Admits headaches are due to light sensitivity at times. Patient admits to having headache-free days. On days she has headaches, she reports a dull ache in the back of her head which she describes as \"annoying. \" Patient admits to Allergies   Allergen Reactions    Bee Venom Swelling     Family History   Problem Relation Age of Onset    Heart Attack Paternal Grandfather         MI   Omar Gonsalez Lupus Paternal Grandmother     Osteoporosis Paternal Grandmother         fibrocystic breasts, uterine fibroids    Cancer Maternal Grandmother         lymphoma    Diabetes Maternal Grandmother     Other Mother         fibroids, fibrocystic breasts, DM2 at 61    Thyroid Disease Mother       Social History     Socioeconomic History    Marital status:      Spouse name: Not on file    Number of children: Not on file    Years of education: Not on file    Highest education level: Not on file   Occupational History    Not on file   Tobacco Use    Smoking status: Never Smoker    Smokeless tobacco: Never Used   Vaping Use    Vaping Use: Never used   Substance and Sexual Activity    Alcohol use: Yes     Alcohol/week: 0.0 standard drinks     Comment: occ    Drug use: No    Sexual activity: Yes     Partners: Male   Other Topics Concern    Not on file   Social History Narrative    Not on file     Social Determinants of Health     Financial Resource Strain:     Difficulty of Paying Living Expenses:    Food Insecurity:     Worried About Running Out of Food in the Last Year:     [address] in the Last Year:    Transportation Needs:     Lack of Transportation (Medical):      Lack of Transportation (Non-Medical):    Physical Activity:     Days of Exercise per Week:     Minutes of Exercise per Session:    Stress:     Feeling of Stress :    Social Connections:     Frequency of Communication with Friends and Family:     Frequency of Social Gatherings with Friends and Family:     Attends Zoroastrian Services:     Active Member of Clubs or Organizations:     Attends Club or Organization Meetings:     Marital Status:    Intimate Partner Violence:     Fear of Current or Ex-Partner:     Emotionally Abused:     Physically Abused:     Sexually Abused: There were no vitals taken for this visit. HEENT [] Hearing Loss  [] Visual Disturbance  [] Tinnitus  [] Eye pain   Respiratory [] Shortness of Breath  [] Cough  [] Snoring   Cardiovascular [] Chest Pain  [] Palpitations  [] Lightheaded   GI [] Constipation  [] Diarrhea  [] Swallowing change  [] Nausea/vomiting    [] Urinary Frequency  [] Urinary Urgency   Musculoskeletal [] Neck pain  [] Back pain  [] Muscle pain  [] Restless legs   Dermatologic [] Skin changes   Neurologic [] Memory loss/confusion  [] Seizures  [] Trouble walking or imbalance  [x] Dizziness  [] Sleep disturbance  [] Weakness  [x] Numbness  [] Tremors  [] Speech Difficulty  [x] Headaches  [] Light Sensitivity  [] Sound Sensitivity   Endocrinology []Excessive thirst  []Excessive hunger   Psychiatric [] Anxiety/Depression  [] Hallucination   Allergy/immunology []Hives/environmental allergies   Hematologic/lymph [] Abnormal bleeding  [] Abnormal bruising       General appearence: Normal. Well groomed.  In no acute distress    Head: Normocephalic, atraumatic  Eyes: Extraocular movements intact, eye lids normal  Lungs: Respirations unlabored, chest wall no deformity  ENT: Normal external ear canals, no sinus tenderness  Heart: Regular rate rhythm  Abdomen: No masses, tenderness  Extremities: No cyanosis or edema, 2+ pulses  Musculoskeletal: Normal range of motion in all joints  Skin: Intact, normal skin color    Neurological examination:    Mental status   Alert and oriented; intact memory with no confusion, speech or language problems; no hallucinations or delusions     Cranial nerves   II - visual fields intact to confrontation                                                III, IV, VI  extra-ocular muscles full: no pupillary defect; no ROSARIO, no nystagmus, no ptosis   V - normal facial sensation                                                               VII - normal facial symmetry VIII - intact hearing                                                                             IX, X - symmetrical palate                                                                  XI - symmetrical shoulder shrug                                                       XII - midline tongue without atrophy or fasciculation     Motor function  Normal muscle bulk and tone; normal power 5/5, including fine motor movements     Sensory function Intact to touch, pin, vibration, proprioception     Cerebellar Intact fine motor movement. No involuntary movements or tremors     Reflex function Intact 2+ DTR and symmetric. Negative Babinski     Gait                  Normal station and gait       No results found for: LDLCALC, LDLCHOLESTEROL, LDLDIRECT  No components found for: CHLPL  No results found for: TRIG  No results found for: HDL  No results found for: LDLCALC  No results found for: LABVLDL  Lab Results   Component Value Date    LABA1C 5.0 05/28/2016     Lab Results   Component Value Date    EAG 97 05/28/2016     No results found for: Carly Shabazz     Neurological work up:  CT head  CTA head and neck  MRI brain 05/2021    2 D echo     All of patient's labs were personally reviewed. All the imaging studies were personally reviewed and discussed with the patient. Assessment Recommendations:  Abnormal MRI scan of the brain with white matter changes. The differential diagnosis includes changes secondary to migraine, a possibility of inflammatory/demyelinating etiology cannot be ruled out. Patient admits to dull, aching sensation in her left leg. I will order an MRI Cervical, Thoracic, and Lumbar spine for further investigation. Plan pending results. I discussed with patient the option to pursue a spinal tap, but we will hold off at this time. Patient's BP has been running low recently, most likely due to Propanolol 20 mg BID.  I recommend discontinuing Propanolol and initiating Topamax 25 mg BID for further relief of symptoms. Meanwhile, I recommend patient continue Verapamil 80 mg nightly as a prophylactic headache medication. Benefits, down sides, and side effects of Topamax were discussed. Patient's questions were answered. Follow up in 4-6 weeks or sooner if symptoms worsen. Ubaldo Sanderson MD I would like to thank you for the consult. Please do not hesitate if you have any questions about the patient care. This note is created with the assistance of a speech-recognition program. While intending to generate a document that actually reflects the content of the visit, the document can still have some errors including those of syntax and sound a- like substitutions which may escape proofreading. In such instances, actual meaning can be extrapolated by contextual derivation. Scribe Attestation:   By signing my name below, I, Angelina Stovall, attest that this documentation has been prepared under the direction and in the presence of Spring Uribe MD.      Electronically Signed: Angelina Stovall. 10/14/2021 2:32 PM      Physician Attestation:  Mick Alonzo MD, personally performed the services described in this documentation. All medical record entries made by the scribe were at my direction and in my presence. I have reviewed the chart and discharge instructions (if applicable) and agree that the record reflects my personal performance and is accurate and complete.     Electronically Signed: Christos Leahy 10/14/2021 2:32 PM    Diplomate, American Board of Psychiatry and Neurology  Diplomate, American Board of Clinical Neurophysiology  Diplomate, American Board of Epilepsy

## 2021-10-19 ENCOUNTER — TELEPHONE (OUTPATIENT)
Dept: NEUROLOGY | Age: 41
End: 2021-10-19

## 2021-10-30 ENCOUNTER — HOSPITAL ENCOUNTER (OUTPATIENT)
Dept: MRI IMAGING | Age: 41
Discharge: HOME | End: 2021-11-01
Payer: COMMERCIAL

## 2021-10-30 DIAGNOSIS — G37.9 DEMYELINATING DISEASE (HCC): ICD-10-CM

## 2021-10-30 PROCEDURE — 72158 MRI LUMBAR SPINE W/O & W/DYE: CPT

## 2021-10-30 PROCEDURE — A9579 GAD-BASE MR CONTRAST NOS,1ML: HCPCS | Performed by: PSYCHIATRY & NEUROLOGY

## 2021-10-30 PROCEDURE — 72156 MRI NECK SPINE W/O & W/DYE: CPT

## 2021-10-30 PROCEDURE — 6360000004 HC RX CONTRAST MEDICATION: Performed by: PSYCHIATRY & NEUROLOGY

## 2021-10-30 RX ADMIN — GADOTERIDOL 20 ML: 279.3 INJECTION, SOLUTION INTRAVENOUS at 08:24

## 2021-11-08 ENCOUNTER — HOSPITAL ENCOUNTER (OUTPATIENT)
Dept: MRI IMAGING | Age: 41
Discharge: HOME | End: 2021-11-10
Payer: COMMERCIAL

## 2021-11-08 DIAGNOSIS — G37.9 DEMYELINATING DISEASE (HCC): ICD-10-CM

## 2021-11-08 PROCEDURE — A9579 GAD-BASE MR CONTRAST NOS,1ML: HCPCS | Performed by: PSYCHIATRY & NEUROLOGY

## 2021-11-08 PROCEDURE — 72157 MRI CHEST SPINE W/O & W/DYE: CPT

## 2021-11-08 PROCEDURE — 6360000004 HC RX CONTRAST MEDICATION: Performed by: PSYCHIATRY & NEUROLOGY

## 2021-11-08 RX ADMIN — GADOTERIDOL 20 ML: 279.3 INJECTION, SOLUTION INTRAVENOUS at 17:44

## 2021-11-22 ENCOUNTER — HOSPITAL ENCOUNTER (OUTPATIENT)
Age: 41
Discharge: HOME | End: 2021-11-22
Payer: COMMERCIAL

## 2021-11-22 LAB
ESTIMATED AVERAGE GLUCOSE: 100 MG/DL
FOLATE: 10.8 NG/ML
HBA1C MFR BLD: 5.1 % (ref 4–6)
VITAMIN B-12: 565 PG/ML (ref 232–1245)

## 2021-11-22 PROCEDURE — 82607 VITAMIN B-12: CPT

## 2021-11-22 PROCEDURE — 36415 COLL VENOUS BLD VENIPUNCTURE: CPT

## 2021-11-22 PROCEDURE — 82746 ASSAY OF FOLIC ACID SERUM: CPT

## 2021-11-22 PROCEDURE — 83921 ORGANIC ACID SINGLE QUANT: CPT

## 2021-11-22 PROCEDURE — 84443 ASSAY THYROID STIM HORMONE: CPT

## 2021-11-22 PROCEDURE — 83036 HEMOGLOBIN GLYCOSYLATED A1C: CPT

## 2021-11-23 LAB — TSH SERPL DL<=0.05 MIU/L-ACNC: 1.52 MIU/L (ref 0.3–5)

## 2021-11-25 LAB — METHYLMALONIC ACID: <0.1 UMOL/L (ref 0–0.4)

## 2021-11-30 ENCOUNTER — OFFICE VISIT (OUTPATIENT)
Dept: NEUROLOGY | Age: 41
End: 2021-11-30
Payer: COMMERCIAL

## 2021-11-30 VITALS
TEMPERATURE: 97.4 F | BODY MASS INDEX: 30.78 KG/M2 | DIASTOLIC BLOOD PRESSURE: 58 MMHG | HEART RATE: 60 BPM | HEIGHT: 70 IN | SYSTOLIC BLOOD PRESSURE: 99 MMHG | WEIGHT: 215 LBS

## 2021-11-30 DIAGNOSIS — R42 DIZZINESS: Primary | ICD-10-CM

## 2021-11-30 PROCEDURE — 99214 OFFICE O/P EST MOD 30 MIN: CPT | Performed by: PSYCHIATRY & NEUROLOGY

## 2021-11-30 NOTE — PROGRESS NOTES
[facility], [address], [address] [place] [address]  Ph: [phone] or [phone]  FAX: [phone]    Chief Complaint: Dizziness and Headaches     Dear Morena Alamo MD     I had the pleasure of seeing your patient today in neurology consultation for her symptoms. As you would recall Rosamaria Chow is a 35 y.o. female. Patient reports to the office following a 05/26/2021 MRI scan of the brain that showed scattered FLAIR signal abnormalities. This MRI was completed after the patient experienced dizziness, brain fog, and headaches. Compared to a previous MRI scan in 2006, there are a greater number of FLAIR abnormalities. Patient reports a history of headaches. She admits to 3-4 headaches weekly but has never taken a prophylactic medication. She does take Tylenol, however. She admits to intermitte tingling of the hands and feet. Additionally, the patient admits to the occasional soreness on her breastbone. Other reported symptoms include blue flashes, arthritis pain, fatigue, ringing in the ears, and a facial rash that is sensitive to the sun. The patient reports a family history of lupus. Upon onset of dizziness, patient feels she may pass out and admits to feeling off balance. She has noticed headaches tend to occur in the morning upon waking. Admits headaches are due to light sensitivity at times. Patient admits to having headache-free days. On days she has headaches, she reports a dull ache in the back of her head which she describes as \"annoying. \" Patient admits to pressure in the frontal portion of her brain upon onset of headaches as well. No alleviation of headaches with medication. Denies having trouble falling asleep at nighttime but admits to waking in the middle of the night. States she has difficulty putting her socks, decreased ROM. Today, the patient reports to the office following a visit from [facility] for a second opinion.  She is unsure of the efficacy of Topamax with headaches. She is compliant with Topamax 25 mg BID. Patient admits to experience tingling as a side effect; however, she reports her brain fog has subsided. She continues to experience intermittent dizziness. For the patient, the dizziness symptom is more concerning than the headache. Patient's dizziness is associated with fatigue throughout the day. Her dizziness is worse in the evening. Patient admits that changes in position can worsen symptoms. She admits to a history of low blood pressure and pulse. The patient continues to experience pain in her left hip. Current prophylactic medication Dosage   Topamax 25 mg BID               Previous prophylactic medications Reason for discontinuation   Verapamil Lack of Efficacy             Previous Abortive medications Reason for discontinuation   Tylenol Lack of Efficacy   Ibuprofen Lack of Efficacy             Neurological Workup:  MRI Thoracic Spine W WO Contrast on 11/08/2021: No focus of cord signal abnormality.  No focus of abnormal enhancement. At T6-T7, T7-T8, T8-T9 and T9-T10, posterior disc protrusion indents the anterior spinal cord. MRI Cervical Spine W WO Contrast on 10/30/2021: No convincing abnormal cord signal or enhancement. Mild spinal canal stenosis at C5-C6 and C6-C7. Minimal left neural foraminal narrowing at C5-C6. MRI Lumbar Spine W WO Contrast on 10/30/2021:  Minimal spinal canal stenosis at L1-L2.  No spinal canal stenosis at the remaining levels. Neural foraminal narrowing at L3-L4 and L4-L5 as above. No evidence of spondylolisthesis. MRI Brain W WO Contrast on 05/26/2021: Scattered FLAIR signal abnormalities in the subcortical and periventricular white matter that are nonspecific and differential considerations include a post infectious/inflammatory process, vasculitis, or a demyelinating process. These can also be seen with chronic headaches. There is no abnormal postcontrast enhancement.   STEPHENIE 8/11/2021 Alcohol/week: 0.0 standard drinks     Comment: occ    Drug use: No    Sexual activity: Yes     Partners: Male   Other Topics Concern    Not on file   Social History Narrative    Not on file     Social Determinants of Health     Financial Resource Strain:     Difficulty of Paying Living Expenses: Not on file   Food Insecurity:     Worried About Running Out of Food in the Last Year: Not on file    Magdy of Food in the Last Year: Not on file   Transportation Needs:     Lack of Transportation (Medical): Not on file    Lack of Transportation (Non-Medical): Not on file   Physical Activity:     Days of Exercise per Week: Not on file    Minutes of Exercise per Session: Not on file   Stress:     Feeling of Stress : Not on file   Social Connections:     Frequency of Communication with Friends and Family: Not on file    Frequency of Social Gatherings with Friends and Family: Not on file    Attends Temple Services: Not on file    Active Member of [address] [place] [address] or Organizations: Not on file    Attends Club or Organization Meetings: Not on file    Marital Status: Not on file   Intimate Partner Violence:     Fear of Current or Ex-Partner: Not on file    Emotionally Abused: Not on file    Physically Abused: Not on file    Sexually Abused: Not on file   Housing Stability:     Unable to Pay for Housing in the Last Year: Not on file    Number of [place] in the Last Year: Not on file    Unstable Housing in the Last Year: Not on file      There were no vitals taken for this visit.      HEENT [] Hearing Loss  [] Visual Disturbance  [] Tinnitus  [] Eye pain   Respiratory [] Shortness of Breath  [] Cough  [] Snoring   Cardiovascular [] Chest Pain  [] Palpitations  [] Lightheaded   GI [] Constipation  [] Diarrhea  [] Swallowing change  [] Nausea/vomiting    [] Urinary Frequency  [] Urinary Urgency   Musculoskeletal [] Neck pain  [] Back pain  [] Muscle pain  [] Restless legs   Dermatologic [] Skin changes Neurologic [] Memory loss/confusion  [] Seizures  [] Trouble walking or imbalance  [x] Dizziness  [] Sleep disturbance  [] Weakness  [x] Numbness  [] Tremors  [] Speech Difficulty  [x] Headaches  [] Light Sensitivity  [] Sound Sensitivity   Endocrinology []Excessive thirst  []Excessive hunger   Psychiatric [] Anxiety/Depression  [] Hallucination   Allergy/immunology []Hives/environmental allergies   Hematologic/lymph [] Abnormal bleeding  [] Abnormal bruising       General appearence: Normal. Well groomed.  In no acute distress    Head: Normocephalic, atraumatic  Eyes: Extraocular movements intact, eye lids normal  Lungs: Respirations unlabored, chest wall no deformity  ENT: Normal external ear canals, no sinus tenderness  Heart: Regular rate rhythm  Abdomen: No masses, tenderness  Extremities: No cyanosis or edema, 2+ pulses  Musculoskeletal: Normal range of motion in all joints  Skin: Intact, normal skin color    Neurological examination:    Mental status   Alert and oriented; intact memory with no confusion, speech or language problems; no hallucinations or delusions     Cranial nerves   II - visual fields intact to confrontation                                                III, IV, VI  extra-ocular muscles full: no pupillary defect; no ROSARIO, no nystagmus, no ptosis   V - normal facial sensation                                                               VII - normal facial symmetry                                                             VIII - intact hearing                                                                             IX, X - symmetrical palate                                                                  XI - symmetrical shoulder shrug                                                       XII - midline tongue without atrophy or fasciculation     Motor function  Normal muscle bulk and tone; normal power 5/5, including fine motor movements     Sensory function Intact to touch, pin, vibration, proprioception     Cerebellar Intact fine motor movement. No involuntary movements or tremors     Reflex function Intact 2+ DTR and symmetric. Negative Babinski     Gait                  Normal station and gait       No results found for: LDLCALC, LDLCHOLESTEROL, LDLDIRECT  No components found for: CHLPL  No results found for: TRIG  No results found for: HDL  No results found for: LDLCALC  No results found for: LABVLDL  Lab Results   Component Value Date    LABA1C 5.1 11/22/2021     Lab Results   Component Value Date     11/22/2021     Lab Results   Component Value Date    [id number] 11/22/2021        Neurological work up:  CT head  CTA head and neck  MRI brain 05/2021    2 D echo     All of patient's labs were personally reviewed. All the imaging studies were personally reviewed and discussed with the patient. Assessment Recommendations:  Abnormal MRI scan of the brain with white matter changes. The differential diagnosis includes changes secondary to migraine, a possibility of inflammatory/demyelinating etiology cannot be ruled out. The patient's recent cervical, thoracic, and lumbar MRI scans showed no convincing abnormal cord signal or enhancement. I recommend the patient complete repeat MRI imagining of the spine every year for surveillance purposes. If the patient's symptoms worsen, the scans will be completed sooner. The patient continues to experience intermittent dizziness. I recommend she complete a tilt table test. I may refer the patient to cardiology in the future depending on the results. I also discussed the possibility of initiating a blood pressure medication with the patient if the tilt table test is negative. The patient reports minimal relief of headaches with Topamax 25 mg BID. I discussed with the patient about increasing Topamax or changing to another medication for improved headache prophylaxis.  For now, the patient would like to continue Topamax 25 mg BID.    Patient reports she continues to experience severe pain in her left hip. I will review the recent MRI of the lumbar spine with radiology to discuss if the left hip is visible. If not, I recommend the patient complete an X-ray of the hip to identify possible arthritis. Patient to follow up in 2-3 months or sooner if symptoms worsen. Garry Hurtado MD I would like to thank you for the consult. Please do not hesitate if you have any questions about the patient care. This note is created with the assistance of a speech-recognition program. While intending to generate a document that actually reflects the content of the visit, the document can still have some errors including those of syntax and sound a- like substitutions which may escape proofreading. In such instances, actual meaning can be extrapolated by contextual derivation. Scribe Attestation:   By signing my name below, Yvonne Keyes, attest that this documentation has been prepared under the direction and in the presence of Milan Ramirez MD.    Electronically Signed: Vanessa Ruffin. 11/30/21. 2:12 PM    Physician Attestation:  Sergio Jameson MD, personally performed the services described in this documentation. All medical record entries made by the scribe were at my direction and in my presence. I have reviewed the chart and discharge instructions (if applicable) and agree that the record reflects my personal performance and is accurate and complete.     Electronically Signed: Navneet Servin 11/30/2021 2:12 PM    Diplomate, American Board of Psychiatry and Neurology  Diplomate, American Board of Clinical Neurophysiology  Diplomate, American Board of Epilepsy

## 2021-12-10 ENCOUNTER — TELEPHONE (OUTPATIENT)
Dept: NEUROLOGY | Age: 41
End: 2021-12-10

## 2021-12-10 NOTE — TELEPHONE ENCOUNTER
Coleman Contreras called in. She said [facility] advised her they don't have the order for the Tilt Table test. Will refax today.

## 2021-12-21 ENCOUNTER — HOSPITAL ENCOUNTER (OUTPATIENT)
Dept: NON INVASIVE DIAGNOSTICS | Age: 41
Discharge: HOME | End: 2021-12-21
Payer: COMMERCIAL

## 2021-12-21 PROCEDURE — 2580000003 HC RX 258: Performed by: PSYCHIATRY & NEUROLOGY

## 2021-12-21 PROCEDURE — 93660 TILT TABLE EVALUATION: CPT

## 2021-12-21 PROCEDURE — 6370000000 HC RX 637 (ALT 250 FOR IP): Performed by: PSYCHIATRY & NEUROLOGY

## 2021-12-21 RX ORDER — SODIUM CHLORIDE 9 MG/ML
INJECTION, SOLUTION INTRAVENOUS CONTINUOUS
Status: DISCONTINUED | OUTPATIENT
Start: 2021-12-21 | End: 2021-12-22 | Stop reason: HOSPADM

## 2021-12-21 RX ORDER — ATROPINE SULFATE 0.1 MG/ML
0.5 INJECTION INTRAVENOUS PRN
Status: DISCONTINUED | OUTPATIENT
Start: 2021-12-21 | End: 2021-12-22 | Stop reason: HOSPADM

## 2021-12-21 RX ORDER — 0.9 % SODIUM CHLORIDE 0.9 %
1000 INTRAVENOUS SOLUTION INTRAVENOUS ONCE
Status: DISCONTINUED | OUTPATIENT
Start: 2021-12-21 | End: 2021-12-22 | Stop reason: HOSPADM

## 2021-12-21 RX ORDER — NITROGLYCERIN 0.4 MG/1
0.4 TABLET SUBLINGUAL EVERY 5 MIN PRN
Status: DISCONTINUED | OUTPATIENT
Start: 2021-12-21 | End: 2021-12-22 | Stop reason: HOSPADM

## 2021-12-21 RX ADMIN — NITROGLYCERIN 0.4 MG: 0.4 TABLET SUBLINGUAL at 10:07

## 2021-12-21 RX ADMIN — SODIUM CHLORIDE: 9 INJECTION, SOLUTION INTRAVENOUS at 09:00

## 2021-12-21 NOTE — PROCEDURES
1 [facility] Cardiopulmonary Department   Referring Physician: Abhilash Lakhani MD  Tilt Table Vitals and Observations     Test Indication: Dizziness          Performing Technologist: William Rossi  Pre-test Preparation:       Performing Nurse: Leno Coffman  No food or drink for 6 hours    Overseeing Physician: Alexandre Huff  Appropriate medications held for 24 hours   Patient verified availability of transportation post-test   No contraindications present   Physician approval received prior to test initiation   Time-out performed    TIME BP HR RHYTHM OBSERVATION   BASELINE  0945 112/43 70 NSR Dizziness at baseline   UPRIGHT  0950 116/73 67 NSR Increased spin, Sensation of falling forward   2:00 112/70 67 NSR Falling sensation relieved, Remains dizzy   4:00 118/67 66 NSR    6:00 111/69 74 NSR    8:00 116/71 73 NSR    10:00 115/69 69 NSR Dizziness resolved   12:00 109/69 71 NSR    14:00 119/69 70 NSR Onset of headache 4/10, \"funny feeling\", lightheaded   16:00 125/79 71 NSR    18:00 117/74 75 NSR 0.4mg SL nitro given at 1007 (17 min into test)   20:00 119/66 92 NSR    22:00 111/70 94 NSR Warm feeling   24:00 121/70 92 NSR Test ended per Dr. Israel Bosewll at 1014 (24 minutes into test)   Imm. Post  Supine  1016 119/65 64 NSR           FINAL  1018 119/63 63 NSR       Post-test:   Patient offered snack/drink per protocol  All appropriate equipment/IV access removed  Discharge instructions provided with expressed understanding from patient  Patient escorted from department to appropriate exit location    Physician conclusion:  - Borderline Tilt (only HR increased to 94 from 70 after Nitro) with no significant drop in BP- therefore likely negative study  - She did have symptoms of lightheadedness throughout    Recommendation:  - follow up with ordering provider    Alexandre Huff DO, [facility], [address], [address] [facility]  [email or web address]  [phone]

## 2022-02-28 RX ORDER — TOPIRAMATE 25 MG/1
TABLET ORAL
Qty: 60 TABLET | Refills: 0 | Status: SHIPPED | OUTPATIENT
Start: 2022-02-28 | End: 2022-04-08

## 2022-02-28 NOTE — TELEPHONE ENCOUNTER
Pharmacy requesting refill of Topamax 25 mg.      Medication active on med list yes      Date of last Rx: 10/14/2021 with 3 refills          verified by SUMI CLEMONS      Date of last appointment 11/30/2021    Next Visit Date:  3/16/2022

## 2022-03-16 ENCOUNTER — TELEPHONE (OUTPATIENT)
Dept: NEUROLOGY | Age: 42
End: 2022-03-16

## 2022-03-16 ENCOUNTER — OFFICE VISIT (OUTPATIENT)
Dept: NEUROLOGY | Age: 42
End: 2022-03-16
Payer: COMMERCIAL

## 2022-03-16 VITALS
SYSTOLIC BLOOD PRESSURE: 109 MMHG | HEIGHT: 70 IN | BODY MASS INDEX: 30.29 KG/M2 | DIASTOLIC BLOOD PRESSURE: 69 MMHG | HEART RATE: 61 BPM | WEIGHT: 211.6 LBS

## 2022-03-16 DIAGNOSIS — G43.711 CHRONIC MIGRAINE WITHOUT AURA, WITH INTRACTABLE MIGRAINE, SO STATED, WITH STATUS MIGRAINOSUS: Primary | ICD-10-CM

## 2022-03-16 PROCEDURE — 99214 OFFICE O/P EST MOD 30 MIN: CPT | Performed by: PSYCHIATRY & NEUROLOGY

## 2022-03-16 RX ORDER — MAGNESIUM 200 MG
200 TABLET ORAL DAILY
Qty: 30 TABLET | Refills: 2 | Status: SHIPPED | OUTPATIENT
Start: 2022-03-16

## 2022-03-16 RX ORDER — SUMATRIPTAN 50 MG/1
50 TABLET, FILM COATED ORAL
Qty: 9 TABLET | Refills: 3 | Status: SHIPPED | OUTPATIENT
Start: 2022-03-16 | End: 2022-10-06

## 2022-03-16 NOTE — PATIENT INSTRUCTIONS
Options for headache relief are   1- Botox injections for migraines  2-Medications which are once a month injections Aimovig, Ajovy, Emgality

## 2022-03-16 NOTE — PROGRESS NOTES
[facility], [address], [address] [place] [address]  Ph: [phone] or [phone]  FAX: [phone]    Chief Complaint: Dizziness and Headaches     Dear Norm Martell MD     I had the pleasure of seeing your patient today in neurology consultation for her symptoms. As you would recall Tamica Vital is a 35 y.o. female. Patient reports to the office following a 05/26/2021 MRI scan of the brain that showed scattered FLAIR signal abnormalities. This MRI was completed after the patient experienced dizziness, brain fog, and headaches. Compared to a previous MRI scan in 2006, there are a greater number of FLAIR abnormalities. Patient reports a history of headaches. She admits to 3-4 headaches weekly but has never taken a prophylactic medication. She does take Tylenol, however. She admits to intermitte tingling of the hands and feet. Additionally, the patient admits to the occasional soreness on her breastbone. Other reported symptoms include blue flashes, arthritis pain, fatigue, ringing in the ears, and a facial rash that is sensitive to the sun. The patient reports a family history of lupus. Upon onset of dizziness, patient feels she may pass out and admits to feeling off balance. She has noticed headaches tend to occur in the morning upon waking. Admits headaches are due to light sensitivity at times. Patient admits to having headache-free days. On days she has headaches, she reports a dull ache in the back of her head which she describes as \"annoying. \" Patient admits to pressure in the frontal portion of her brain upon onset of headaches as well. No alleviation of headaches with medication. Patient's dizziness is associated with fatigue throughout the day. Her dizziness is worse in the evening. Denies having trouble falling asleep at nighttime but admits to waking in the middle of the night. Today, the patient reports that she continues to experience headaches. She is interested in switching Topamax due to frequent breakthrough headaches occurring multiple times a week. Patient reports that her headaches and dizziness are worse during her menstruation cycle. She admits that her dizziness is likely associated with her headaches. The patient has observed visual disturbances in her peripheral vision. Patient reports that she experiences intermittent numbness and tingling which may be a side effect from Topamax. She admits to using the compression stocking as well. The patient continues to experience pain in her left hip and knee along with decreased ROM. Current prophylactic medication Dosage                   Previous prophylactic medications Reason for discontinuation   Verapamil Lack of Efficacy   Topamax Lack of Efficacy         Previous Abortive medications Reason for discontinuation   Tylenol Lack of Efficacy   Ibuprofen Lack of Efficacy         Testing Reviewed:  Tilt-table test on 11/30/2021: Borderline Tilt (only HR increased to 94 from 70 after Nitro) with no significant drop in BP- therefore likely negative study. MRI Thoracic Spine W WO Contrast on 11/08/2021: No focus of cord signal abnormality.  No focus of abnormal enhancement. At T6-T7, T7-T8, T8-T9 and T9-T10, posterior disc protrusion indents the anterior spinal cord. MRI Cervical Spine W WO Contrast on 10/30/2021: No convincing abnormal cord signal or enhancement. Mild spinal canal stenosis at C5-C6 and C6-C7. Minimal left neural foraminal narrowing at C5-C6. MRI Lumbar Spine W WO Contrast on 10/30/2021:  Minimal spinal canal stenosis at L1-L2.  No spinal canal stenosis at the remaining levels. Neural foraminal narrowing at L3-L4 and L4-L5 as above. No evidence of spondylolisthesis.   MRI Brain W WO Contrast on 05/26/2021: Scattered FLAIR signal abnormalities in the subcortical and periventricular white matter that are nonspecific and differential considerations include a post infectious/inflammatory process, vasculitis, or a demyelinating process. These can also be seen with chronic headaches. There is no abnormal postcontrast enhancement. STEPHENIE 2021 negative  CTA Head Neck W Contrast 2021: No acute intracranial abnormality. Unremarkable CTA of the head and neck given the limited evaluation of the aortic arch, the innominate and subclavian arteries and left vertebral artery secondary to streak artifact from contrast.  No evidence of a flow-limiting stenosis, major branch vessel occlusion, or aneurysm. Scattered low-attenuation areas are noted supratentorially, corresponding with the areas of increased T2 signal noted on the previous MRI.  This could be related to early chronic microvascular white matter ischemic disease, demyelinating disease, migraines, or vasculitis to name a few etiologies.   Anti-DNA, HIV, Lyme disease, Rheumatoid, and Sjogren's all performed on 8/10/2021: unremarkable     Past Medical History:   Diagnosis Date    History of low transverse  section     Postpartum depression     Varicose veins      Past Surgical History:   Procedure Laterality Date     SECTION  2016    SEPTOPLASTY      WISDOM TOOTH EXTRACTION       Allergies   Allergen Reactions    Bee Venom Swelling     Family History   Problem Relation Age of Onset    Heart Attack Paternal Grandfather         MI   Lopez Lupus Paternal Grandmother     Osteoporosis Paternal Grandmother         fibrocystic breasts, uterine fibroids    Cancer Maternal Grandmother         lymphoma    Diabetes Maternal Grandmother     Other Mother         fibroids, fibrocystic breasts, DM2 at 61    Thyroid Disease Mother       Social History     Socioeconomic History    Marital status:      Spouse name: Not on file    Number of children: Not on file    Years of education: Not on file    Highest education level: Not on file   Occupational History    Not on file   Tobacco Use    Smoking status: Never Smoker    Smokeless tobacco: Never Used   Vaping Use    Vaping Use: Never used   Substance and Sexual Activity    Alcohol use: Yes     Alcohol/week: 0.0 standard drinks     Comment: occ    Drug use: No    Sexual activity: Yes     Partners: Male   Other Topics Concern    Not on file   Social History Narrative    Not on file     Social Determinants of Health     Financial Resource Strain:     Difficulty of Paying Living Expenses: Not on file   Food Insecurity:     Worried About Running Out of Food in the Last Year: Not on file    Magdy of Food in the Last Year: Not on file   Transportation Needs:     Lack of Transportation (Medical): Not on file    Lack of Transportation (Non-Medical): Not on file   Physical Activity:     Days of Exercise per Week: Not on file    Minutes of Exercise per Session: Not on file   Stress:     Feeling of Stress : Not on file   Social Connections:     Frequency of Communication with Friends and Family: Not on file    Frequency of Social Gatherings with Friends and Family: Not on file    Attends Mosque Services: Not on file    Active Member of [address] [place] [address] or Organizations: Not on file    Attends Club or Organization Meetings: Not on file    Marital Status: Not on file   Intimate Partner Violence:     Fear of Current or Ex-Partner: Not on file    Emotionally Abused: Not on file    Physically Abused: Not on file    Sexually Abused: Not on file   Housing Stability:     Unable to Pay for Housing in the Last Year: Not on file    Number of [place] in the Last Year: Not on file    Unstable Housing in the Last Year: Not on file      There were no vitals taken for this visit.      HEENT [] Hearing Loss  [x] Visual Disturbance  [] Tinnitus  [] Eye pain   Respiratory [] Shortness of Breath  [] Cough  [] Snoring   Cardiovascular [] Chest Pain  [] Palpitations  [] Lightheaded   GI [] Constipation  [] Diarrhea  [] Swallowing change  [] Nausea/vomiting  [] Urinary Frequency  [] Urinary Urgency   Musculoskeletal [] Neck pain  [] Back pain  [] Muscle pain  [] Restless legs   Dermatologic [] Skin changes   Neurologic [] Memory loss/confusion  [] Seizures  [] Trouble walking or imbalance  [x] Dizziness  [] Sleep disturbance  [] Weakness  [x] Numbness  [] Tremors  [] Speech Difficulty  [x] Headaches  [] Light Sensitivity  [] Sound Sensitivity   Endocrinology []Excessive thirst  []Excessive hunger   Psychiatric [] Anxiety/Depression  [] Hallucination   Allergy/immunology []Hives/environmental allergies   Hematologic/lymph [] Abnormal bleeding  [] Abnormal bruising       General appearence: Normal. Well groomed.  In no acute distress    Head: Normocephalic, atraumatic  Eyes: Extraocular movements intact, eye lids normal  Lungs: Respirations unlabored, chest wall no deformity  ENT: Normal external ear canals, no sinus tenderness  Heart: Regular rate rhythm  Abdomen: No masses, tenderness  Extremities: No cyanosis or edema, 2+ pulses  Musculoskeletal: Normal range of motion in all joints  Skin: Intact, normal skin color    Neurological examination:    Mental status   Alert and oriented; intact memory with no confusion, speech or language problems; no hallucinations or delusions     Cranial nerves   II - visual fields intact to confrontation                                                III, IV, VI  extra-ocular muscles full: no pupillary defect; no ROSARIO, no nystagmus, no ptosis   V - normal facial sensation                                                               VII - normal facial symmetry                                                             VIII - intact hearing                                                                             IX, X - symmetrical palate                                                                  XI - symmetrical shoulder shrug                                                       XII - midline tongue without atrophy or fasciculation     Motor function  Normal muscle bulk and tone; normal power 5/5, including fine motor movements     Sensory function Intact to touch, pin, vibration, proprioception     Cerebellar Intact fine motor movement. No involuntary movements or tremors     Reflex function Intact 2+ DTR and symmetric. Negative Babinski     Gait                  Normal station and gait       No results found for: LDLCALC, LDLCHOLESTEROL, LDLDIRECT  No components found for: CHLPL  No results found for: TRIG  No results found for: HDL  No results found for: LDLCALC  No results found for: LABVLDL  Lab Results   Component Value Date    LABA1C 5.1 11/22/2021     Lab Results   Component Value Date     11/22/2021     Lab Results   Component Value Date    [id number] 11/22/2021        Neurological work up:  CT head  CTA head and neck  MRI brain 05/2021    2 D echo     All of patient's labs were personally reviewed. All the imaging studies were personally reviewed and discussed with the patient. Assessment Recommendations:  Abnormal MRI scan of the brain with white matter changes. The differential diagnosis includes changes secondary to migraine, a possibility of inflammatory/demyelinating etiology cannot be ruled out. The patient continues to experience breakthrough headaches multiple times a week in addition to frequent dizzy spells which may be associated with her headaches. She has failed 2 prophylactic headache medications, and I discussed the possibility of initiating Botox injections every 3 months for headache prophylaxis. Monthly injections such as Aimovig, Emgality, and Ajovy were also discussed. For abortive treatment, patient to initiate Imitrex 50 mg as needed. Additionally, I recommend she take magnesium oxide 200 mg daily which may improve symptoms.         The patient has been seen in clinic multiple [address] for her abnormal MRI scan at this time, the MRI findings are believed to be secondary to small vessel disease and patient's headache. I will obtain a repeat MRI scan of the brain with and without contrast     The patient continues to have intermittent dizziness which in part could be related to her headaches. Her tilt test was negative. At this time, recommend continued treatment for migraines with Botox injections all with CGRP antagonist i.e. Aimovig, Emgality or Ajovy. All medication side effects were discussed and questions answered. Patient to follow up in 3-4 months or sooner if symptoms worsen. Ciro Jain MD I would like to thank you for the consult. Please do not hesitate if you have any questions about the patient care. This note is created with the assistance of a speech-recognition program. While intending to generate a document that actually reflects the content of the visit, the document can still have some errors including those of syntax and sound a- like substitutions which may escape proofreading. In such instances, actual meaning can be extrapolated by contextual derivation. Scribe Attestation:   By signing my name below, Юлия Benoit, attest that this documentation has been prepared under the direction and in the presence of Ivan Shine MD.    Electronically Signed: Dee Rocha. 03/16/22. 8:44 AM    Physician Attestation:  Abe Uribe MD, personally performed the services described in this documentation. All medical record entries made by the scribe were at my direction and in my presence. I have reviewed the chart and discharge instructions (if applicable) and agree that the record reflects my personal performance and is accurate and complete.     Electronically Signed: Andi Peres 3/16/2022 8:44 AM    Diplomate, American Board of Psychiatry and Neurology  Diplomate, American Board of Clinical Neurophysiology  Diplomate, American Board of Epilepsy

## 2022-03-16 NOTE — TELEPHONE ENCOUNTER
Kevyn Vela called in after her visit with Dr. Alicia Bishop. She said that she didn't mention that she had had mononucleosis at the age of 21 and was sick enough with it that she did spend a night in the ER. She wanted Dr. Alicia Bishop aware of this because she knows that recently there has been some assosciation of certain things in patients who had had mono.   I advised her I would pass the message on to Dr. Alicia Bishop

## 2022-04-08 RX ORDER — TOPIRAMATE 25 MG/1
TABLET ORAL
Qty: 60 TABLET | Refills: 2 | Status: SHIPPED | OUTPATIENT
Start: 2022-04-08 | End: 2022-07-27

## 2022-04-08 NOTE — TELEPHONE ENCOUNTER
Pharmacy requesting refill of Topamax 25 mg .      Medication active on med list yes      Date of last Rx: 02/28/22 with 0 refills          verified by SR RMA      Date of last appointment 3/16/2022    Next Visit Date:  7/7/2022

## 2022-04-22 ENCOUNTER — HOSPITAL ENCOUNTER (OUTPATIENT)
Dept: MRI IMAGING | Facility: CLINIC | Age: 42
Discharge: HOME | End: 2022-04-24
Payer: COMMERCIAL

## 2022-04-22 DIAGNOSIS — G43.711 CHRONIC MIGRAINE WITHOUT AURA, WITH INTRACTABLE MIGRAINE, SO STATED, WITH STATUS MIGRAINOSUS: ICD-10-CM

## 2022-04-22 PROCEDURE — 6360000004 HC RX CONTRAST MEDICATION: Performed by: PSYCHIATRY & NEUROLOGY

## 2022-04-22 PROCEDURE — 2580000003 HC RX 258: Performed by: PSYCHIATRY & NEUROLOGY

## 2022-04-22 PROCEDURE — A9579 GAD-BASE MR CONTRAST NOS,1ML: HCPCS | Performed by: PSYCHIATRY & NEUROLOGY

## 2022-04-22 PROCEDURE — 70553 MRI BRAIN STEM W/O & W/DYE: CPT

## 2022-04-22 RX ORDER — SODIUM CHLORIDE 0.9 % (FLUSH) 0.9 %
10 SYRINGE (ML) INJECTION PRN
Status: DISCONTINUED | OUTPATIENT
Start: 2022-04-22 | End: 2022-04-25 | Stop reason: HOSPADM

## 2022-04-22 RX ADMIN — Medication 10 ML: at 09:33

## 2022-04-22 RX ADMIN — GADOTERIDOL 20 ML: 279.3 INJECTION, SOLUTION INTRAVENOUS at 09:33

## 2022-05-02 NOTE — TELEPHONE ENCOUNTER
Patient stated that her insurance would not cover Botox or Aimovig or any injectables. Patient stated that she would like to wing off of Topamax she would like to know is there an other alternates that you can recommend.  Please Advise

## 2022-05-04 ENCOUNTER — TELEPHONE (OUTPATIENT)
Dept: NEUROLOGY | Age: 42
End: 2022-05-04

## 2022-05-04 RX ORDER — AMITRIPTYLINE HYDROCHLORIDE 25 MG/1
25 TABLET, FILM COATED ORAL NIGHTLY
Qty: 30 TABLET | Refills: 0 | Status: SHIPPED | OUTPATIENT
Start: 2022-05-04 | End: 2022-07-27

## 2022-05-04 NOTE — TELEPHONE ENCOUNTER
I talked with Zev Perez and she would like to know what Dr. Shailesh Newman thinks about the Brain MRI results and does she need to do anything further?

## 2022-05-04 NOTE — TELEPHONE ENCOUNTER
MRI of the brain appears stable from 2021. There are no new lesions. Previously is a question if there is an postsuspension has been seen at clinic does not believe so. I do not believe these changes are the reason for the headache however the only way to rule out inflammatory process/multiple screws would be to obtain spinal  tap if patient wants  to proceed.

## 2022-05-05 NOTE — TELEPHONE ENCOUNTER
I reviewed Dr. Tripp Mc thoughts with her and she voiced understanding. She prefers not to have an LP at this time. I did put her on the cancellation list and can move up from July. I also advised her to call us if she had any changes. She voiced understanding.

## 2022-07-27 ENCOUNTER — OFFICE VISIT (OUTPATIENT)
Dept: OBGYN CLINIC | Age: 42
End: 2022-07-27
Payer: COMMERCIAL

## 2022-07-27 ENCOUNTER — HOSPITAL ENCOUNTER (OUTPATIENT)
Age: 42
Setting detail: SPECIMEN
Discharge: HOME | End: 2022-07-27

## 2022-07-27 VITALS
BODY MASS INDEX: 30.64 KG/M2 | DIASTOLIC BLOOD PRESSURE: 74 MMHG | HEIGHT: 70 IN | WEIGHT: 214 LBS | SYSTOLIC BLOOD PRESSURE: 112 MMHG

## 2022-07-27 DIAGNOSIS — Z01.419 WELL WOMAN EXAM: Primary | ICD-10-CM

## 2022-07-27 DIAGNOSIS — N63.25 BREAST LUMP ON LEFT SIDE AT 6 O'CLOCK POSITION: ICD-10-CM

## 2022-07-27 DIAGNOSIS — Z12.31 ENCOUNTER FOR SCREENING MAMMOGRAM FOR MALIGNANT NEOPLASM OF BREAST: ICD-10-CM

## 2022-07-27 PROCEDURE — 99396 PREV VISIT EST AGE 40-64: CPT | Performed by: NURSE PRACTITIONER

## 2022-07-27 ASSESSMENT — PATIENT HEALTH QUESTIONNAIRE - PHQ9
SUM OF ALL RESPONSES TO PHQ QUESTIONS 1-9: 0
1. LITTLE INTEREST OR PLEASURE IN DOING THINGS: 0
2. FEELING DOWN, DEPRESSED OR HOPELESS: 0
SUM OF ALL RESPONSES TO PHQ9 QUESTIONS 1 & 2: 0

## 2022-07-27 NOTE — PROGRESS NOTES
History and Physical  [address], [address] [facility] [address], [address] [facility] [phone]   Fax [phone]  Alex English  2022              35 y.o. Chief Complaint   Patient presents with    Annual Exam       Patient's last menstrual period was 2022. Primary Care Physician: Ely Raymond MD    The patient was seen and examined. She has no chief complaint today and is here for her annual exam.  Her bowels are regular. There are no voiding complaints. She denies any bloating. She denies vaginal discharge and was counseled on STD's and the need for barrier contraception. HPI : Alex English is a 35 y.o. female     Annual exam  No chief complaint  Periods occurring every 27-28 days the last couple of months. Used to occur further apart. Lasting 4-5 days- normal flow.  2nd day is heaviest day.    ________________________________________________________________________  OB History    Para Term  AB Living   2 2 2 0 0 2   SAB IAB Ectopic Molar Multiple Live Births   0 0 0 0 0 2      # Outcome Date GA Lbr [place]/2nd Weight Sex Delivery Anes PTL Lv   2 Term 16 38w0d  8 lb 5.7 oz (3.79 kg) F CS-LTranv Spinal N JOSSUE      Name: Jordan Arias: 8  Apgar5: 9   1 Term 11 39w2d  7 lb 1 oz (3.204 kg) F Vag-Spont EPI  JOSSUE      Name: Varinder Mao     Past Medical History:   Diagnosis Date    History of low transverse  section     Mononucleosis     at age of 21    Postpartum depression 2011    Varicose veins                                                                    Past Surgical History:   Procedure Laterality Date     SECTION  2016    SEPTOPLASTY      WISDOM TOOTH EXTRACTION       Family History   Problem Relation Age of Onset    Heart Attack Paternal Grandfather         MI    Lupus Paternal Grandmother     Osteoporosis Paternal Grandmother         fibrocystic breasts, uterine fibroids Cancer Maternal Grandmother         lymphoma    Diabetes Maternal Grandmother     Other Mother         fibroids, fibrocystic breasts, DM2 at 61    Thyroid Disease Mother      Social History     Socioeconomic History    Marital status:      Spouse name: Not on file    Number of children: Not on file    Years of education: Not on file    Highest education level: Not on file   Occupational History    Not on file   Tobacco Use    Smoking status: Never    Smokeless tobacco: Never   Vaping Use    Vaping Use: Never used   Substance and Sexual Activity    Alcohol use: Yes     Alcohol/week: 0.0 standard drinks     Comment: occ    Drug use: No    Sexual activity: Yes     Partners: Male   Other Topics Concern    Not on file   Social History Narrative    Not on file     Social Determinants of Health     Financial Resource Strain: Not on file   Food Insecurity: Not on file   Transportation Needs: Not on file   Physical Activity: Not on file   Stress: Not on file   Social Connections: Not on file   Intimate Partner Violence: Not on file   Housing Stability: Not on file       MEDICATIONS:  Current Outpatient Medications   Medication Sig Dispense Refill    magnesium 200 MG TABS tablet Take 1 tablet by mouth daily 30 tablet 2    SUMAtriptan (IMITREX) 50 MG tablet Take 1 tablet by mouth once as needed for Migraine 9 tablet 3     No current facility-administered medications for this visit. ALLERGIES:  Allergies as of 07/27/2022 - Fully Reviewed 07/27/2022   Allergen Reaction Noted    Bee venom Swelling 01/25/2016       Symptoms of decreased mood absent  Symptoms of anhedonia absent    **If either question is answered in a  positive fashion then complete the PHQ9 Scoring Evaluation and make the appropriate referral**      Immunization status: stated as current, but no records available. Gynecologic History:  Menarche: 13 yo  Menopause at [address] [facility] yo     Patient's last menstrual period was 07/05/2022.     Sexually Active: Yes    STD History: No     Permanent Sterilization: Yes  with vasectomy   Reversible Birth Control: No        Hormone Replacement Exposure: No      Genetic Qualified Family History of Breast, Ovarian , Colon or Uterine Cancer: No     If YES see scanned worksheet. Preventative Health Testing:    Health Maintenance:  Health Maintenance Due   Topic Date Due    Hepatitis C screen  Never done    Lipids  Never done    Depression Screen  07/27/2022       Date of Last Pap Smear: 7/27/2021 neg/neg  Abnormal Pap Smear History: denies  Colposcopy History:   Date of Last Mammogram: 8/19/2021 negative  Date of Last Colonoscopy:   Date of Last Bone Density:      ________________________________________________________________________        REVIEW OF SYSTEMS:    yes   A minimum of an eleven point review of systems was completed. Review Of Systems (11 point):  Constitutional: No fever, chills or malaise; No weight change or fatigue  Head and Eyes: No vision changes, Headache, Dizziness or trauma in last 12 months  ENT ROS: No hearing, Tinnitis, sinus or taste problems  Hematological and Lymphatic ROS:No Lymphoma, Von Willebrand's, Hemophillia or Bleeding History  Psych ROS: No Depression, Homicidal thoughts,suicidal thoughts, or anxiety  Breast ROS: No breast abnormalities or lumps  Respiratory ROS: No SOB, Pneumoniae,Cough, or Pulmonary Embolism   Cardiovascular ROS: No Chest Pain with Exertion, Palpitations, Syncope, Edema, Arrhythmia  Gastrointestinal ROS: No Indigestion, Heartburn, Nausea, vomiting, Diarrhea, Constipation,or Bowel Changes; No Bloody Stools or melena  Genito-Urinary ROS: No Dysuria, Hematuria or Nocturia.  No Urinary Incontinence or Vaginal Discharge  Musculoskeletal ROS: No Arthralgia, Arthritis,Gout,Osteoporosis or Rheumatism  Neurological ROS: No CVA, Migraines, Epilepsy, Seizure Hx, or Limb Weakness  Dermatological ROS: No Rash, Itching, Hives, Mole Changes or Cancer PHYSICAL Exam:     Constitutional:  Vitals:    07/27/22 0952   BP: 112/74   Site: Right Upper Arm   Position: Sitting   Cuff Size: Medium Adult   Weight: 214 lb (97.1 kg)   Height: 5' 10\" (1.778 m)       Chaperone for Intimate Exam  Chaperone was offered and accepted as part of the rooming process. Chaperone: April          General Appearance: This  is a well Developed, well Nourished, well groomed female. Her BMI was reviewed. Nutritional decision making was discussed. Skin:  There was a Normal Inspection of the skin without rashes or lesions. There were no rashes. (Papular, Maculopapular, Hives, Pustular, Macular)     There were no lesions (Ulcers, Erythema, Abn. Appearing Nevi)            Lymphatic:  No Lymph Nodes were Palpable in the neck , axilla or groin.  0 # Of Lymph Nodes; Location ; Character [Normal]  [Shotty] [Tender] [Enlarged]     Neck and EENT:  The neck was supple. There were no masses   The thyroid was not enlarged and had no masses. Perrla, EOMI B/L, TMI B/L No Abnormalities. Throat inspected-No exudates or Masses, Nares Patent No Masses        Respiratory: The lungs were auscultated and found to be clear. There were no rales, rhonchi or wheezes. There was a good respiratory effort. Cardiovascular: The heart was in a regular rate and rhythm. . No S3 or S4. There was no murmur appreciated. Location, grade, and radiation are not applicable. Extremities: The patients extremities were without calf tenderness, edema, or varicosities. There was full range of motion in all four extremities. Pulses in all four extremities were appreciated and are 2/4. Abdomen: The abdomen was soft and non-tender.  There were good bowel sounds in all quadrants and there was no guarding, rebound or rigidity. On evaluation there was no evidence of hepatosplenomegaly and there was no costal vertebral kamilla tenderness bilaterally. No hernias were appreciated. Abdominal Scars: C/S scar    Psych: The patient had a normal Orientation to: Time, Place, Person, and Situation  There is no Mood / Affect changes    Breast:  (Chest)  normal appearance, no masses or tenderness, No nipple retraction or dimpling, No nipple discharge or bleeding, No axillary or supraclavicular adenopathy, positive findings: round breast lump noted at 6 o'clock on left breast.  Self breast exams were reviewed in detail. Literature was given. Pelvic Exam:  External genitalia: normal general appearance  Urinary system: urethral meatus normal  Vaginal: normal mucosa without prolapse or lesions  Cervix: normal appearance  Adnexa: normal bimanual exam  Uterus: normal single, nontender    Rectal Exam:  exam declined by patient          Musculosk:  Normal Gait and station was noted. Digits were evaluated without abnormal findings. Range of motion, stability and strength were evaluated and found to be appropriate for the patients age. ASSESSMENT:      35 y.o. Annual   Diagnosis Orders   1. Well woman exam  PAP SMEAR      2. Encounter for screening mammogram for malignant neoplasm of breast  MELISSA DIGITAL DIAGNOSTIC W OR WO CAD BILATERAL      3.  Breast lump on left side at 6 o'clock position  MELISSA DIGITAL DIAGNOSTIC W OR WO CAD BILATERAL             Chief Complaint   Patient presents with    Annual Exam          Past Medical History:   Diagnosis Date    History of low transverse  section     Mononucleosis     at age of 21    Postpartum depression 2011    Varicose veins          Patient Active Problem List    Diagnosis Date Noted    Rh+/ RI/ GBS+ 2015     Priority: High     PRENATAL LAB RESULTS:   Blood Type/Rh: B pos  Antibody Screen: negative  Hemoglobin, Hematocrit, Platelets: 90.2  / 38.7 / 253  Rubella: immune  T. Pallidum, IgG: non-reactive   Hepatitis B Surface Antigen: negative   HIV: non-reactive   Sickle Cell Screen: not done  Gonorrhea: negative  Chlamydia: negative  Urine culture: negative, date: 16    1 hour Glucose Tolerance Test: 145     Glucose Fastin  1 hour Glucose Tolerance Test: 119  2 hour Glucose Tolerance Test: 103  3 hour Glucose Tolerance Test: 114    Group B Strep:  positive    Cystic Fibrosis Screen:  negative  First Trimester Screen:  elv free bhCG  NIPT- normal  MSAFP/Multiple Markers:  normal  Anatomy US: Anterior placenta, complete previa, female       History of low transverse  section     PLTCS 14 F Ap/9 Wt: 8#6 2016    Born by  section      Replacing Deprecated Diagnoses            Hereditary Breast, Ovarian, Colon and Uterine Cancer screening Done. Tobacco & Secondary smoke risks reviewed; instructed on cessation and avoidance      Counseling Completed:  Preventative Health Recommendations and Follow up. The patient was informed of the recommended preventative health recommendations. 1. Annuals every year; Cytology collections per prevailing guidelines. 2. Mammograms begin every year at 37 yo if no abnormalities are found and no family history. 3. Bone density studies every 2-3 years. Begin at 73 yo. If no fracture history or osteoporosis family history. (significant). 4. Colonoscopy begin at 38 yo. Repeat every ten years if negative and no family history. 5. Calcium of 0803-4248 mg/day in split dosing  6. Vitamin D 400-800 IU/day  7. All other preventative health recommendations will be managed by the patients Primary care physician. PLAN:  Return in about 1 year (around 2023) for annual.  Pap smear obtained. Diagnostic mammogram ordered. Repeat Annual every 1 year  Cervical Cytology Evaluation begins at 24years old. If Negative Cytology, Follow-up screening per current guidelines. Mammograms every 1 year.  If 35 yo and last mammogram was negative. Calcium and Vitamin D dosing reviewed. Colonoscopy screening reviewed as well as onset for bone density testing. Birth control and barrier recommendations discussed. STD counseling and prevention reviewed. Gardisil counseling completed for all patients 10-35 yo. Routine health maintenance per patients PCP. Orders Placed This Encounter   Procedures    MELISSA DIGITAL DIAGNOSTIC W OR WO CAD BILATERAL     Standing Status:   Future     Standing Expiration Date:   2023     Order Specific Question:   Reason for exam:     Answer:   left breast lump at 6 o'clock, screening for breast cancer    PAP SMEAR     Patient History:    Patient's last menstrual period was 2022. OBGYN Status: Having periods  Past Surgical History:  2016:  SECTION  No date: SEPTOPLASTY  No date: WISDOM TOOTH EXTRACTION      Social History    Tobacco Use      Smoking status: Never      Smokeless tobacco: Never       Standing Status:   Future     Standing Expiration Date:   2023     Order Specific Question:   Collection Type     Answer: Thin Prep     Order Specific Question:   Prior Abnormal Pap Test     Answer:   No     Order Specific Question:   Screening or Diagnostic     Answer:   Screening     Order Specific Question:   HPV Requested? Answer:   Yes     Order Specific Question:   High Risk Patient     Answer:   N/A           The patient, Annmarie Singletary is a 35 y.o. female, was seen with a total time spent of 20 minutes for the visit on this date of service by the E/M provider. The time component had both face to face and non face to face time spent in determining the total time component. Counseling and education regarding her diagnosis listed below and her options regarding those diagnoses were also included in determining her time component. Diagnosis Orders   1. Well woman exam  PAP SMEAR      2.  Encounter for screening mammogram for malignant neoplasm of breast  MELISSA DIGITAL DIAGNOSTIC W OR WO CAD BILATERAL      3. Breast lump on left side at 6 o'clock position  MELISSA DIGITAL DIAGNOSTIC W OR WO CAD BILATERAL           The patient had her preventative health maintenance recommendations and follow-up reviewed with her at the completion of her visit.

## 2022-08-02 ENCOUNTER — HOSPITAL ENCOUNTER (OUTPATIENT)
Dept: WOMENS IMAGING | Age: 42
Discharge: HOME | End: 2022-08-04
Payer: COMMERCIAL

## 2022-08-02 DIAGNOSIS — N63.0 BREAST LUMP: ICD-10-CM

## 2022-08-02 DIAGNOSIS — N63.25 BREAST LUMP ON LEFT SIDE AT 6 O'CLOCK POSITION: ICD-10-CM

## 2022-08-02 DIAGNOSIS — Z12.31 ENCOUNTER FOR SCREENING MAMMOGRAM FOR MALIGNANT NEOPLASM OF BREAST: ICD-10-CM

## 2022-08-02 PROCEDURE — G0279 TOMOSYNTHESIS, MAMMO: HCPCS

## 2022-08-02 PROCEDURE — 76642 ULTRASOUND BREAST LIMITED: CPT

## 2022-08-03 LAB — CYTOLOGY REPORT: NORMAL

## 2022-08-16 ENCOUNTER — OFFICE VISIT (OUTPATIENT)
Dept: NEUROLOGY | Age: 42
End: 2022-08-16
Payer: COMMERCIAL

## 2022-08-16 VITALS
WEIGHT: 214 LBS | DIASTOLIC BLOOD PRESSURE: 75 MMHG | BODY MASS INDEX: 30.64 KG/M2 | SYSTOLIC BLOOD PRESSURE: 113 MMHG | HEART RATE: 73 BPM | HEIGHT: 70 IN

## 2022-08-16 DIAGNOSIS — G43.711 CHRONIC MIGRAINE WITHOUT AURA, WITH INTRACTABLE MIGRAINE, SO STATED, WITH STATUS MIGRAINOSUS: Primary | ICD-10-CM

## 2022-08-16 PROCEDURE — 1036F TOBACCO NON-USER: CPT | Performed by: PSYCHIATRY & NEUROLOGY

## 2022-08-16 PROCEDURE — G8427 DOCREV CUR MEDS BY ELIG CLIN: HCPCS | Performed by: PSYCHIATRY & NEUROLOGY

## 2022-08-16 PROCEDURE — G8417 CALC BMI ABV UP PARAM F/U: HCPCS | Performed by: PSYCHIATRY & NEUROLOGY

## 2022-08-16 PROCEDURE — 99214 OFFICE O/P EST MOD 30 MIN: CPT | Performed by: PSYCHIATRY & NEUROLOGY

## 2022-08-16 NOTE — PROGRESS NOTES
Assessment recommendation:  Chronic migraine headaches without aura without status migrainosus:  Previously the patient was taking topiramate however she has weaned herself off the medication due to side effects of brain fogginess and numbness tingling in extremities. Since of the medication, the patient reports having mild to moderate headaches but also reports having diffuse body numbness and tingling at times which has worsened after stopping topiramate. I gave patient prescription for amitriptyline however she did not start the medication initially due to concern of side effects however, she is agreeable to start the medication. She will call me in about 1 week to report improvement. If patient notices any worsening of her fatigue, dizziness or other side effects, I will stop amitriptyline. Other medications option include Cymbalta or Effexor    Abnormal MRI scan of the brain:  I have reviewed patient's MRI scan of the brain from April 2022 and compared with MRI scan from May 2021. MRI shows mild to moderate white matter hyperintensities however, unchanged from previous scans. Differential diagnosis includes white matter changes secondary to migraines versus inflammatory etiology. Patient previously has been seen by [facility] multiple [address] and multiple sclerosis has been ruled out. Previously I have discussed with the patient about getting a spinal tap however the patient is hesitant to pursue any further work-up. She will follow-up with her primary care physician to get lab work-up for autoimmune arthritis.

## 2022-08-16 NOTE — PROGRESS NOTES
start amitriptyline as she has had trouble with antidepressants in the past. The patient reports that the nerve pain is back since she has stopped Topamax. Denies urinary/bowel incontinence. Gets pains in her hands, knees, feet. Describes the pain as cramping shooting pain. Describes headaches to be the same. Patient has low level dull headaches. Patient describes that she has leg pain and the sore feeling feels different than the exercise sore. The pain will catch her off guard. Uses Imitrex as a rescue medication. Takes Imitrex a few times a week. The patient generally feels fatigued but sleeps well overall. The patient is experiencing a twitch above her mouth. Patient experiences a twitch up to 6 times a day on the left side of her face. Patient is experiencing some back pain as well in her thoracic region. She was experiencing weird feeling at tips of her fingers, felt irritable, felt a lot of brain fog, difficulty concentrating as a result of Topamax. Current prophylactic medication Dosage                   Previous prophylactic medications Reason for discontinuation   Verapamil Lack of Efficacy   Topamax Lack of Efficacy         Previous Abortive medications Reason for discontinuation   Tylenol Lack of Efficacy   Ibuprofen Lack of Efficacy         Testing Reviewed:  MRI Brain W WO Contrast 4/22/2022:  No acute intracranial abnormality per enhanced brain MRI. No acute stroke, intracranial hemorrhage or abnormal enhancement. Nonspecific scattered bilateral white matter FLAIR signal  hyperintensities are similar compared to brain MRI done May 26, 2021. Differential considerations include early chronic small vessel ischemic disease, sequelae of chronic migraine headaches, prior infectious/inflammatory process and demyelinating disease such as multiple sclerosis in the appropriate clinical setting.   Tilt-table test on 11/30/2021: Borderline Tilt (only HR increased to 94 from 70 after Nitro) with no significant drop in BP- therefore likely negative study. MRI Thoracic Spine W WO Contrast on 2021: No focus of cord signal abnormality. No focus of abnormal enhancement. At T6-T7, T7-T8, T8-T9 and T9-T10, posterior disc protrusion indents the anterior spinal cord. MRI Cervical Spine W WO Contrast on 10/30/2021: No convincing abnormal cord signal or enhancement. Mild spinal canal stenosis at C5-C6 and C6-C7. Minimal left neural foraminal narrowing at C5-C6. MRI Lumbar Spine W WO Contrast on 10/30/2021:  Minimal spinal canal stenosis at L1-L2. No spinal canal stenosis at the remaining levels. Neural foraminal narrowing at L3-L4 and L4-L5 as above. No evidence of spondylolisthesis. MRI Brain W WO Contrast on 2021: Scattered FLAIR signal abnormalities in the subcortical and periventricular white matter that are nonspecific and differential considerations include a post infectious/inflammatory process, vasculitis, or a demyelinating process. These can also be seen with chronic headaches. There is no abnormal postcontrast enhancement. STEPHENIE 2021 negative  CTA Head Neck W Contrast 2021: No acute intracranial abnormality. Unremarkable CTA of the head and neck given the limited evaluation of the aortic arch, the innominate and subclavian arteries and left vertebral artery secondary to streak artifact from contrast.  No evidence of a flow-limiting stenosis, major branch vessel occlusion, or aneurysm. Scattered low-attenuation areas are noted supratentorially, corresponding with the areas of increased T2 signal noted on the previous MRI. This could be related to early chronic microvascular white matter ischemic disease, demyelinating disease, migraines, or vasculitis to name a few etiologies.   Anti-DNA, HIV, Lyme disease, Rheumatoid, and Sjogren's all performed on 8/10/2021: unremarkable     Past Medical History:   Diagnosis Date    History of low transverse  section     Mononucleosis     at age of 21    Postpartum depression 2011    Varicose veins      Past Surgical History:   Procedure Laterality Date     SECTION  2016    SEPTOPLASTY      WISDOM TOOTH EXTRACTION       Allergies   Allergen Reactions    Bee Venom Swelling     Family History   Problem Relation Age of Onset    Heart Attack Paternal Grandfather         MI    Lupus Paternal Grandmother     Osteoporosis Paternal Grandmother         fibrocystic breasts, uterine fibroids    Cancer Maternal Grandmother         lymphoma    Diabetes Maternal Grandmother     Other Mother         fibroids, fibrocystic breasts, DM2 at 61    Thyroid Disease Mother       Social History     Socioeconomic History    Marital status:      Spouse name: Not on file    Number of children: Not on file    Years of education: Not on file    Highest education level: Not on file   Occupational History    Not on file   Tobacco Use    Smoking status: Never    Smokeless tobacco: Never   Vaping Use    Vaping Use: Never used   Substance and Sexual Activity    Alcohol use:  Yes     Alcohol/week: 0.0 standard drinks     Comment: occ    Drug use: No    Sexual activity: Yes     Partners: Male   Other Topics Concern    Not on file   Social History Narrative    Not on file     Social Determinants of Health     Financial Resource Strain: Not on file   Food Insecurity: Not on file   Transportation Needs: Not on file   Physical Activity: Not on file   Stress: Not on file   Social Connections: Not on file   Intimate Partner Violence: Not on file   Housing Stability: Not on file      [facility] 2022      HEENT [] Hearing Loss  [x] Visual Disturbance  [] Tinnitus  [] Eye pain   Respiratory [] Shortness of Breath  [] Cough  [] Snoring   Cardiovascular [] Chest Pain  [] Palpitations  [] Lightheaded   GI [] Constipation  [] Diarrhea  [] Swallowing change  [] Nausea/vomiting    [] Urinary Frequency  [] Urinary Urgency   Musculoskeletal [] Neck pain  [] Back pain  [] Muscle pain  [] Restless legs   Dermatologic [] Skin changes   Neurologic [] Memory loss/confusion  [] Seizures  [] Trouble walking or imbalance  [x] Dizziness  [] Sleep disturbance  [] Weakness  [x] Numbness  [] Tremors  [] Speech Difficulty  [x] Headaches  [] Light Sensitivity  [] Sound Sensitivity   Endocrinology []Excessive thirst  []Excessive hunger   Psychiatric [] Anxiety/Depression  [] Hallucination   Allergy/immunology []Hives/environmental allergies   Hematologic/lymph [] Abnormal bleeding  [] Abnormal bruising       General appearence: Normal. Well groomed.  In no acute distress    Head: Normocephalic, atraumatic  Eyes: Extraocular movements intact, eye lids normal  Lungs: Respirations unlabored, chest wall no deformity  ENT: Normal external ear canals, no sinus tenderness  Heart: Regular rate rhythm  Abdomen: No masses, tenderness  Extremities: No cyanosis or edema, 2+ pulses  Musculoskeletal: Normal range of motion in all joints  Skin: Intact, normal skin color    Neurological examination:    Mental status   Alert and oriented; intact memory with no confusion, speech or language problems; no hallucinations or delusions     Cranial nerves   II - visual fields intact to confrontation                                                III, IV, VI - extra-ocular muscles full: no pupillary defect; no ROSARIO, no nystagmus, no ptosis   V - normal facial sensation                                                               VII - normal facial symmetry                                                             VIII - intact hearing                                                                             IX, X - symmetrical palate                                                                  XI - symmetrical shoulder shrug                                                       XII - midline tongue without atrophy or fasciculation     Motor function  Normal muscle bulk and tone; normal power 5/5, including fine motor movements     Sensory function Intact to touch, pin, vibration, proprioception     Cerebellar Intact fine motor movement. No involuntary movements or tremors     Reflex function Intact 2+ DTR and symmetric. Negative Babinski     Gait                  Normal station and gait       No results found for: LDLCALC, LDLCHOLESTEROL, LDLDIRECT  No components found for: CHLPL  No results found for: TRIG  No results found for: HDL  No results found for: LDLCALC  No results found for: LABVLDL  Lab Results   Component Value Date    LABA1C 5.1 11/22/2021     Lab Results   Component Value Date     11/22/2021     Lab Results   Component Value Date    [id number] 201 11/22/2021        Neurological work up:  CT head  CTA head and neck  MRI brain 05/2021    2 D echo     All of patient's labs were personally reviewed. All the imaging studies were personally reviewed and discussed with the patient. Assessment Recommendations:  Chronic migraine headaches without aura without status migrainosus:  Previously the patient was taking topiramate however she has weaned herself off the medication due to side effects of brain fogginess and numbness tingling in extremities. Since of the medication, the patient reports having mild to moderate headaches but also reports having diffuse body numbness and tingling at times which has worsened after stopping topiramate. I gave patient prescription for amitriptyline however she did not start the medication initially due to concern of side effects however, she is agreeable to start the medication. She will call me in about 1 week to report improvement. If patient notices any worsening of her fatigue, dizziness or other side effects, I will stop amitriptyline. Other medications option include Cymbalta or Effexor    Abnormal MRI scan of the brain:  I have reviewed patient's MRI scan of the brain from April 2022 and compared with MRI scan from May 2021.   MRI shows mild to moderate white matter hyperintensities however, unchanged from previous scans. Differential diagnosis includes white matter changes secondary to migraines versus inflammatory etiology. [facility] [place] [id number] [facility] has been ruled out. Previously I have discussed with the patient about getting a spinal tap however the patient is hesitant to pursue any further work-up. She will follow-up with her primary care physician to get lab work-up for autoimmune arthritis. All medication side effects were discussed and questions answered    Patient to follow up in 6-7  or sooner if symptoms worsen. Miguel Martinez MD I would like to thank you for the consult. Please do not hesitate if you have any questions about the patient care. This note is created with the assistance of a speech-recognition program. While intending to generate a document that actually reflects the content of the visit, the document can still have some errors including those of syntax and sound a- like substitutions which may escape proofreading. In such instances, actual meaning can be extrapolated by contextual derivation. Scribe Attestation:   By signing my name below, Ursula Jaramillo, attest that this documentation has been prepared under the direction and in the presence of Jose Alberto Perez MD.    Electronically Signed: Caren Gage. 08/16/22. 3:43 PM    I, Aster Berrios MD, personally performed the services described in this documentation. All medical record entries made by the scribe were at my direction and in my presence. I have reviewed the chart and discharge instructions (if applicable) and agree that the record reflects my personal performance and is accurate and complete.     Electronically Signed: Aster Berrios 8/30/2022 2:04 PM    Diplomate, American Board of Psychiatry and Neurology  Diplomate, American Board of Clinical Neurophysiology  Diplomate, [address] [place] [address] Rd., [address] of Epilepsy

## 2022-09-19 RX ORDER — AMITRIPTYLINE HYDROCHLORIDE 25 MG/1
TABLET, FILM COATED ORAL
Qty: 30 TABLET | Refills: 0 | Status: SHIPPED | OUTPATIENT
Start: 2022-09-19 | End: 2022-10-17

## 2022-09-19 NOTE — TELEPHONE ENCOUNTER
Pharmacy requesting refill of Amitriptyline.       Medication active on med list: no       Date of last fill: 5/4/22 for #[id number]  verified on 9/19/2022    verified by Reba Rene LPN      Date of last appointment: 8/16/2022    Next Visit Date:  10/6/2022 Localized Dermabrasion With Wire Brush Text: The patient was draped in routine manner.  Localized dermabrasion using 3 x 17 mm wire brush was performed in routine manner to papillary dermis. This spot dermabrasion is being performed to complete skin cancer reconstruction. It also will eliminate the other sun damaged precancerous cells that are known to be part of the regional effect of a lifetime's worth of sun exposure. This localized dermabrasion is therapeutic and should not be considered cosmetic in any regard. Localized Dermabrasion Text: The patient was draped in routine manner.  Localized dermabrasion using 3 x 17 mm wire brush was performed in routine manner to papillary dermis. This spot dermabrasion is being performed to complete skin cancer reconstruction. It also will eliminate the other sun damaged precancerous cells that are known to be part of the regional effect of a lifetime's worth of sun exposure. This localized dermabrasion is therapeutic and should not be considered cosmetic in any regard.

## 2022-10-06 ENCOUNTER — OFFICE VISIT (OUTPATIENT)
Dept: NEUROLOGY | Age: 42
End: 2022-10-06
Payer: COMMERCIAL

## 2022-10-06 VITALS
HEART RATE: 66 BPM | SYSTOLIC BLOOD PRESSURE: 106 MMHG | HEIGHT: 70 IN | WEIGHT: 218 LBS | BODY MASS INDEX: 31.21 KG/M2 | DIASTOLIC BLOOD PRESSURE: 70 MMHG

## 2022-10-06 DIAGNOSIS — G43.711 CHRONIC MIGRAINE WITHOUT AURA, WITH INTRACTABLE MIGRAINE, SO STATED, WITH STATUS MIGRAINOSUS: Primary | ICD-10-CM

## 2022-10-06 PROCEDURE — 1036F TOBACCO NON-USER: CPT | Performed by: PSYCHIATRY & NEUROLOGY

## 2022-10-06 PROCEDURE — 99214 OFFICE O/P EST MOD 30 MIN: CPT | Performed by: PSYCHIATRY & NEUROLOGY

## 2022-10-06 PROCEDURE — G8427 DOCREV CUR MEDS BY ELIG CLIN: HCPCS | Performed by: PSYCHIATRY & NEUROLOGY

## 2022-10-06 PROCEDURE — G8484 FLU IMMUNIZE NO ADMIN: HCPCS | Performed by: PSYCHIATRY & NEUROLOGY

## 2022-10-06 PROCEDURE — G8417 CALC BMI ABV UP PARAM F/U: HCPCS | Performed by: PSYCHIATRY & NEUROLOGY

## 2022-10-06 NOTE — PROGRESS NOTES
[facility], [address], [address] [place] [address]  Ph: [phone] or [phone]  FAX: [phone]    Chief Complaint: Dizziness and Headaches     Dear Milagro Barrientos MD     I had the pleasure of seeing your patient today in neurology consultation for her symptoms. As you would recall Keyshawn Cosme is a 35 y.o. female. Patient reports to the office following a 05/26/2021 MRI scan of the brain that showed scattered FLAIR signal abnormalities. This MRI was completed after the patient experienced dizziness, brain fog, and headaches. Compared to a previous MRI scan in 2006, there are a greater number of FLAIR abnormalities. Patient reports a history of headaches. She admits to 3-4 headaches weekly but has never taken a prophylactic medication. She does take Tylenol, however. She admits to intermitte tingling of the hands and feet. Additionally, the patient admits to the occasional soreness on her breastbone. Other reported symptoms include blue flashes, arthritis pain, fatigue, ringing in the ears, and a facial rash that is sensitive to the sun. The patient reports a family history of lupus. Upon onset of dizziness, patient feels she may pass out and admits to feeling off balance. She has noticed headaches tend to occur in the morning upon waking. Admits headaches are due to light sensitivity at times. Patient admits to having headache-free days. On days she has headaches, she reports a dull ache in the back of her head which she describes as \"annoying. \" Patient admits to pressure in the frontal portion of her brain upon onset of headaches as well. No alleviation of headaches with medication. Patient's dizziness is associated with fatigue throughout the day. Her dizziness is worse in the evening. Denies having trouble falling asleep at nighttime but admits to waking in the middle of the night.        The patient did not start amitriptyline as she has had trouble with antidepressants in the past. The patient reports that the nerve pain is back since she has stopped Topamax. Denies urinary/bowel incontinence. Gets pains in her hands, knees, feet. Describes the pain as cramping shooting pain. Describes headaches to be the same. Patient has low level dull headaches. Patient describes that she has leg pain and the sore feeling feels different than the exercise sore. The pain will catch her off guard. Uses Imitrex as a rescue medication. Takes Imitrex a few times a week. The patient generally feels fatigued but sleeps well overall. The patient is experiencing a twitch above her mouth. Patient experiences a twitch up to 6 times a day on the left side of her face. Patient is experiencing some back pain as well in her thoracic region. She was experiencing weird feeling at tips of her fingers, felt irritable, felt a lot of brain fog, difficulty concentrating as a result of Topamax. The patient is presenting today as a follow-up on 10/6/2022. The patient feels that amitriptyline is helping with her dizziness. She is currently on her menstrual cycle and is experiencing increased dizziness as a result. She believes that she is 50% better. She denies any side effects of the medication. She also states that her headaches have improved about 50% as well . Current prophylactic medication Dosage                   Previous prophylactic medications Reason for discontinuation   Verapamil Lack of Efficacy   Topamax Lack of Efficacy         Previous Abortive medications Reason for discontinuation   Tylenol Lack of Efficacy   Ibuprofen Lack of Efficacy         Testing Reviewed:  MRI Brain W WO Contrast 4/22/2022:  No acute intracranial abnormality per enhanced brain MRI. No acute stroke, intracranial hemorrhage or abnormal enhancement. Nonspecific scattered bilateral white matter FLAIR signal  hyperintensities are similar compared to brain MRI done May 26, 2021.  Differential considerations include early chronic small vessel ischemic disease, sequelae of chronic migraine headaches, prior infectious/inflammatory process and demyelinating disease such as multiple sclerosis in the appropriate clinical setting. Tilt-table test on 11/30/2021: Borderline Tilt (only HR increased to 94 from 70 after Nitro) with no significant drop in BP- therefore likely negative study. MRI Thoracic Spine W WO Contrast on 11/08/2021: No focus of cord signal abnormality. No focus of abnormal enhancement. At T6-T7, T7-T8, T8-T9 and T9-T10, posterior disc protrusion indents the anterior spinal cord. MRI Cervical Spine W WO Contrast on 10/30/2021: No convincing abnormal cord signal or enhancement. Mild spinal canal stenosis at C5-C6 and C6-C7. Minimal left neural foraminal narrowing at C5-C6. MRI Lumbar Spine W WO Contrast on 10/30/2021:  Minimal spinal canal stenosis at L1-L2. No spinal canal stenosis at the remaining levels. Neural foraminal narrowing at L3-L4 and L4-L5 as above. No evidence of spondylolisthesis. MRI Brain W WO Contrast on 05/26/2021: Scattered FLAIR signal abnormalities in the subcortical and periventricular white matter that are nonspecific and differential considerations include a post infectious/inflammatory process, vasculitis, or a demyelinating process. These can also be seen with chronic headaches. There is no abnormal postcontrast enhancement. STEPHENIE 8/11/2021 negative  CTA Head Neck W Contrast 8/14/2021: No acute intracranial abnormality. Unremarkable CTA of the head and neck given the limited evaluation of the aortic arch, the innominate and subclavian arteries and left vertebral artery secondary to streak artifact from contrast.  No evidence of a flow-limiting stenosis, major branch vessel occlusion, or aneurysm. Scattered low-attenuation areas are noted supratentorially, corresponding with the areas of increased T2 signal noted on the previous MRI.   This could be related to early chronic microvascular white matter ischemic disease, demyelinating disease, migraines, or vasculitis to name a few etiologies. Anti-DNA, HIV, Lyme disease, Rheumatoid, and Sjogren's all performed on 8/10/2021: unremarkable     Past Medical History:   Diagnosis Date    History of low transverse  section     Mononucleosis     at age of 21    Postpartum depression 2011    Varicose veins      Past Surgical History:   Procedure Laterality Date     SECTION  2016    SEPTOPLASTY      WISDOM TOOTH EXTRACTION       Allergies   Allergen Reactions    Bee Venom Swelling     Family History   Problem Relation Age of Onset    Heart Attack Paternal Grandfather         MI    Lupus Paternal Grandmother     Osteoporosis Paternal Grandmother         fibrocystic breasts, uterine fibroids    Cancer Maternal Grandmother         lymphoma    Diabetes Maternal Grandmother     Other Mother         fibroids, fibrocystic breasts, DM2 at 61    Thyroid Disease Mother       Social History     Socioeconomic History    Marital status:      Spouse name: Not on file    Number of children: Not on file    Years of education: Not on file    Highest education level: Not on file   Occupational History    Not on file   Tobacco Use    Smoking status: Never    Smokeless tobacco: Never   Vaping Use    Vaping Use: Never used   Substance and Sexual Activity    Alcohol use:  Yes     Alcohol/week: 0.0 standard drinks     Comment: occ    Drug use: No    Sexual activity: Yes     Partners: Male   Other Topics Concern    Not on file   Social History Narrative    Not on file     Social Determinants of Health     Financial Resource Strain: Not on file   Food Insecurity: Not on file   Transportation Needs: Not on file   Physical Activity: Not on file   Stress: Not on file   Social Connections: Not on file   Intimate Partner Violence: Not on file   Housing Stability: Not on file      /70 (Site: Left Upper Arm, Position: Sitting, Cuff Size: Medium Adult)   Pulse 66   Ht 5' 10\" (1.778 m)   Wt 218 lb (98.9 kg)   BMI 31.28 kg/m²      HEENT [] Hearing Loss  [x] Visual Disturbance  [] Tinnitus  [] Eye pain   Respiratory [] Shortness of Breath  [] Cough  [] Snoring   Cardiovascular [] Chest Pain  [] Palpitations  [] Lightheaded   GI [] Constipation  [] Diarrhea  [] Swallowing change  [] Nausea/vomiting    [] Urinary Frequency  [] Urinary Urgency   Musculoskeletal [] Neck pain  [] Back pain  [] Muscle pain  [] Restless legs   Dermatologic [] Skin changes   Neurologic [] Memory loss/confusion  [] Seizures  [] Trouble walking or imbalance  [x] Dizziness  [] Sleep disturbance  [] Weakness  [x] Numbness  [] Tremors  [] Speech Difficulty  [x] Headaches  [] Light Sensitivity  [] Sound Sensitivity   Endocrinology []Excessive thirst  []Excessive hunger   Psychiatric [] Anxiety/Depression  [] Hallucination   Allergy/immunology []Hives/environmental allergies   Hematologic/lymph [] Abnormal bleeding  [] Abnormal bruising       General appearence: Normal. Well groomed.  In no acute distress    Head: Normocephalic, atraumatic  Eyes: Extraocular movements intact, eye lids normal  Lungs: Respirations unlabored, chest wall no deformity  ENT: Normal external ear canals, no sinus tenderness  Heart: Regular rate rhythm  Abdomen: No masses, tenderness  Extremities: No cyanosis or edema, 2+ pulses  Musculoskeletal: Normal range of motion in all joints  Skin: Intact, normal skin color    Neurological examination:    Mental status   Alert and oriented; intact memory with no confusion, speech or language problems; no hallucinations or delusions     Cranial nerves   II - visual fields intact to confrontation                                                III, IV, VI - extra-ocular muscles full: no pupillary defect; no ROSARIO, no nystagmus, no ptosis   V - normal facial sensation                                                               VII - normal facial symmetry VIII - intact hearing                                                                             IX, X - symmetrical palate                                                                  XI - symmetrical shoulder shrug                                                       XII - midline tongue without atrophy or fasciculation     Motor function  Normal muscle bulk and tone; normal power 5/5, including fine motor movements     Sensory function Intact to touch, pin, vibration, proprioception     Cerebellar Intact fine motor movement. No involuntary movements or tremors     Reflex function Intact 2+ DTR and symmetric. Negative Babinski     Gait                  Normal station and gait       No results found for: LDLCALC, LDLCHOLESTEROL, LDLDIRECT  No components found for: CHLPL  No results found for: TRIG  No results found for: HDL  No results found for: LDLCALC  No results found for: LABVLDL  Lab Results   Component Value Date    LABA1C 5.1 11/22/2021     Lab Results   Component Value Date     11/22/2021     Lab Results   Component Value Date    [id number] 066 11/22/2021        Neurological work up:  CT head  CTA head and neck  MRI brain 05/2021    2 D echo     All of patient's labs were personally reviewed. All the imaging studies were personally reviewed and discussed with the patient. Assessment Recommendations:  Chronic migraine headaches without aura without status migrainosus:  Previously the patient was taking topiramate however she has weaned herself off the medication due to side effects of brain fogginess and numbness tingling in extremities. Since of the medication, the patient reports having mild to moderate headaches but also reports having diffuse body numbness and tingling at times which has worsened after stopping topiramate.   I gave patient prescription for amitriptyline however she did not start the medication initially due to concern of side effects however, she is agreeable to start the medication. Patient started amitriptyline and has reported 50 % improvement in her dizziness and headaches. She denied any side effects of the medication. I presented the option of increasing the dose of amitriptyline for further headache prophylaxis. However, patient would like to hold off at this time. I advised her to keep a calendar of her headaches to determine if the dose should be increased. All medication side effects were discussed and questions answered. Patient to follow up in 4-5 months or sooner if symptoms worsen. Tamica Wilson MD I would like to thank you for the consult. Please do not hesitate if you have any questions about the patient care. This note is created with the assistance of a speech-recognition program. While intending to generate a document that actually reflects the content of the visit, the document can still have some errors including those of syntax and sound a- like substitutions which may escape proofreading. In such instances, actual meaning can be extrapolated by contextual derivation. Scribe Attestation:   By signing my name below, I, Maria Guadalupe Driscoll, attest that this documentation has been prepared under the direction and in the presence of Tereza Pearce MD.    Electronically Signed: KRISTAN CLOUD. 10/06/22. 3:08 PM      I, Edgardo Hyatt MD, personally performed the services described in this documentation. All medical record entries made by the scribe were at my direction and in my presence. I have reviewed the chart and discharge instructions (if applicable) and agree that the record reflects my personal performance and is accurate and complete.     Electronically Signed: Edgardo Hyatt 10/13/2022 9:04 AM    Diplomate, American Board of Psychiatry and Neurology  Diplomate, American Board of Clinical Neurophysiology  Diplomate, American Board of Epilepsy

## 2022-10-17 RX ORDER — AMITRIPTYLINE HYDROCHLORIDE 25 MG/1
TABLET, FILM COATED ORAL
Qty: 30 TABLET | Refills: 3 | Status: SHIPPED | OUTPATIENT
Start: 2022-10-17 | End: 2023-02-14

## 2022-10-17 NOTE — TELEPHONE ENCOUNTER
Pharmacy requesting refill of amitriptyline 25 mg.      Medication active on med list yes      Date of last Rx: 9/19/2022 with 0 refills          verified by SB, RMA      Date of last appointment 10/6/2022    Next Visit Date:   To return in 4-5 months (Feb or March 2023)

## 2022-12-20 ENCOUNTER — TELEPHONE (OUTPATIENT)
Dept: NEUROLOGY | Age: 42
End: 2022-12-20

## 2023-02-15 RX ORDER — AMITRIPTYLINE HYDROCHLORIDE 25 MG/1
TABLET, FILM COATED ORAL
Qty: 30 TABLET | Refills: 2 | Status: SHIPPED | OUTPATIENT
Start: 2023-02-15 | End: 2023-06-15

## 2023-02-15 NOTE — TELEPHONE ENCOUNTER
Pharmacy requesting refill of Amitriptyline 25 mg .      Medication active on med list yes      Date of last Rx: 10/17/22 with 3 refills          verified by SUMI PATRICIO      Date of last appointment 10/06/22    Next Visit Date:  5/9/2023

## 2023-04-24 NOTE — TELEPHONE ENCOUNTER
Pharmacy requesting refill of SUMAtriptan (IMITREX) 50 MG tablet       Medication active on med list yes      Date of last Rx: 3/16/2022 with 3 refills          verified by SUMI CRAWFORD      Date of last appointment 10/6/2022    Next Visit Date:  5/9/2023

## 2023-04-25 RX ORDER — SUMATRIPTAN 50 MG/1
50 TABLET, FILM COATED ORAL
Qty: 9 TABLET | Refills: 1 | Status: SHIPPED | OUTPATIENT
Start: 2023-04-25 | End: 2023-04-25

## 2023-05-09 ENCOUNTER — OFFICE VISIT (OUTPATIENT)
Dept: NEUROLOGY | Age: 43
End: 2023-05-09
Payer: COMMERCIAL

## 2023-05-09 VITALS
HEART RATE: 71 BPM | WEIGHT: 231 LBS | HEIGHT: 70 IN | DIASTOLIC BLOOD PRESSURE: 73 MMHG | SYSTOLIC BLOOD PRESSURE: 121 MMHG | BODY MASS INDEX: 33.07 KG/M2

## 2023-05-09 DIAGNOSIS — G43.711 CHRONIC MIGRAINE WITHOUT AURA, WITH INTRACTABLE MIGRAINE, SO STATED, WITH STATUS MIGRAINOSUS: Primary | ICD-10-CM

## 2023-05-09 PROCEDURE — 99214 OFFICE O/P EST MOD 30 MIN: CPT | Performed by: PSYCHIATRY & NEUROLOGY

## 2023-05-09 PROCEDURE — 1036F TOBACCO NON-USER: CPT | Performed by: PSYCHIATRY & NEUROLOGY

## 2023-05-09 PROCEDURE — G8427 DOCREV CUR MEDS BY ELIG CLIN: HCPCS | Performed by: PSYCHIATRY & NEUROLOGY

## 2023-05-09 PROCEDURE — G8417 CALC BMI ABV UP PARAM F/U: HCPCS | Performed by: PSYCHIATRY & NEUROLOGY

## 2023-05-09 RX ORDER — SUMATRIPTAN 50 MG/1
50 TABLET, FILM COATED ORAL
Qty: 9 TABLET | Refills: 1 | Status: SHIPPED | OUTPATIENT
Start: 2023-05-09 | End: 2023-05-09

## 2023-05-09 RX ORDER — AMITRIPTYLINE HYDROCHLORIDE 25 MG/1
25 TABLET, FILM COATED ORAL NIGHTLY
Qty: 90 TABLET | Refills: 3 | Status: SHIPPED | OUTPATIENT
Start: 2023-05-09 | End: 2023-09-06

## 2023-05-09 NOTE — PROGRESS NOTES
[facility], [address], [address] [place] [address]  Ph: [phone] or [phone]  FAX: [phone]    Chief Complaint: Dizziness and Headaches     Dear Sue Son MD     I had the pleasure of seeing your patient today in neurology consultation for her symptoms. As you would recall Bradly Gutiérrez is a 38 y.o. female. Patient reports to the office following a 05/26/2021 MRI scan of the brain that showed scattered FLAIR signal abnormalities. This MRI was completed after the patient experienced dizziness, brain fog, and headaches. Compared to a previous MRI scan in 2006, there are a greater number of FLAIR abnormalities. Patient reports a history of headaches. She admits to 3-4 headaches weekly but has never taken a prophylactic medication. She does take Tylenol, however. She admits to intermitte tingling of the hands and feet. Additionally, the patient admits to the occasional soreness on her breastbone. Other reported symptoms include blue flashes, arthritis pain, fatigue, ringing in the ears, and a facial rash that is sensitive to the sun. The patient reports a family history of lupus. Upon onset of dizziness, patient feels she may pass out and admits to feeling off balance. She has noticed headaches tend to occur in the morning upon waking. Admits headaches are due to light sensitivity at times. Patient admits to having headache-free days. On days she has headaches, she reports a dull ache in the back of her head which she describes as \"annoying. \" Patient admits to pressure in the frontal portion of her brain upon onset of headaches as well. No alleviation of headaches with medication. Patient's dizziness is associated with fatigue throughout the day. Her dizziness is worse in the evening. Denies having trouble falling asleep at nighttime but admits to waking in the middle of the night.        The patient did not start amitriptyline as she has had trouble with

## 2023-08-01 ENCOUNTER — OFFICE VISIT (OUTPATIENT)
Dept: OBGYN CLINIC | Age: 43
End: 2023-08-01
Payer: COMMERCIAL

## 2023-08-01 ENCOUNTER — HOSPITAL ENCOUNTER (OUTPATIENT)
Age: 43
Setting detail: SPECIMEN
Discharge: HOME | End: 2023-08-01

## 2023-08-01 VITALS
BODY MASS INDEX: 32.64 KG/M2 | SYSTOLIC BLOOD PRESSURE: 108 MMHG | DIASTOLIC BLOOD PRESSURE: 70 MMHG | HEIGHT: 70 IN | WEIGHT: 228 LBS

## 2023-08-01 DIAGNOSIS — Z01.419 WELL FEMALE EXAM WITH ROUTINE GYNECOLOGICAL EXAM: Primary | ICD-10-CM

## 2023-08-01 DIAGNOSIS — Z12.31 ENCOUNTER FOR SCREENING MAMMOGRAM FOR MALIGNANT NEOPLASM OF BREAST: ICD-10-CM

## 2023-08-01 DIAGNOSIS — Z11.51 SPECIAL SCREENING EXAMINATION FOR HUMAN PAPILLOMAVIRUS (HPV): ICD-10-CM

## 2023-08-01 PROCEDURE — 99396 PREV VISIT EST AGE 40-64: CPT | Performed by: NURSE PRACTITIONER

## 2023-08-01 ASSESSMENT — PATIENT HEALTH QUESTIONNAIRE - PHQ9
SUM OF ALL RESPONSES TO PHQ QUESTIONS 1-9: 0
SUM OF ALL RESPONSES TO PHQ9 QUESTIONS 1 & 2: 0
SUM OF ALL RESPONSES TO PHQ QUESTIONS 1-9: 0
1. LITTLE INTEREST OR PLEASURE IN DOING THINGS: 0
SUM OF ALL RESPONSES TO PHQ QUESTIONS 1-9: 0
2. FEELING DOWN, DEPRESSED OR HOPELESS: 0
SUM OF ALL RESPONSES TO PHQ QUESTIONS 1-9: 0

## 2023-08-01 NOTE — PROGRESS NOTES
migraines  Dermatological ROS: No Rash, Itching, Hives, Mole Changes or Cancer                                                                                                                                                                                                                                  PHYSICAL Exam:     Constitutional:  Vitals:    08/01/23 1037   BP: 108/70   Site: Right Upper Arm   Position: Sitting   Cuff Size: Large Adult   Weight: 228 lb (103.4 kg)   Height: 5' 10\" (1.778 m)       Chaperone for Intimate Exam  Chaperone was offered and accepted as part of the rooming process. Chaperone: Andrew Magallanes Appearance: This  is a well Developed, well Nourished, well groomed female. Her BMI was reviewed. Nutritional decision making was discussed. Skin:  There was a Normal Inspection of the skin without rashes or lesions. There were no rashes. (Papular, Maculopapular, Hives, Pustular, Macular)     There were no lesions (Ulcers, Erythema, Abn. Appearing Nevi)            Lymphatic:  No Lymph Nodes were Palpable in the neck , axilla or groin.  0 # Of Lymph Nodes; Location ; Character [Normal]  [Shotty] [Tender] [Enlarged]     Neck and EENT:  The neck was supple. There were no masses   The thyroid was not enlarged and had no masses. Perrla, EOMI B/L, TMI B/L No Abnormalities. Throat inspected-No exudates or Masses, Nares Patent No Masses        Respiratory: The lungs were auscultated and found to be clear. There were no rales, rhonchi or wheezes. There was a good respiratory effort. Cardiovascular: The heart was in a regular rate and rhythm. . No S3 or S4. There was no murmur appreciated. Location, grade, and radiation are not applicable. Extremities: The patients extremities were without calf tenderness, edema, or varicosities. There was full range of motion in all four extremities. Pulses in all four extremities were appreciated and are 2/4. Abdomen:   The abdomen was soft

## 2023-08-04 LAB
HPV I/H RISK 4 DNA CVX QL NAA+PROBE: NOT DETECTED
HPV SAMPLE: NORMAL
HPV, INTERPRETATION: NORMAL
HPV16 DNA CVX QL NAA+PROBE: NOT DETECTED
HPV18 DNA CVX QL NAA+PROBE: NOT DETECTED
SPECIMEN DESCRIPTION: NORMAL

## 2023-08-11 LAB — CYTOLOGY REPORT: NORMAL

## 2023-08-28 ENCOUNTER — HOSPITAL ENCOUNTER (OUTPATIENT)
Dept: WOMENS IMAGING | Age: 43
Discharge: HOME | End: 2023-08-30
Payer: COMMERCIAL

## 2023-08-28 DIAGNOSIS — Z12.31 ENCOUNTER FOR SCREENING MAMMOGRAM FOR MALIGNANT NEOPLASM OF BREAST: ICD-10-CM

## 2023-08-28 PROCEDURE — 77063 BREAST TOMOSYNTHESIS BI: CPT

## 2023-11-20 ENCOUNTER — OFFICE VISIT (OUTPATIENT)
Dept: NEUROLOGY | Age: 43
End: 2023-11-20
Payer: COMMERCIAL

## 2023-11-20 VITALS
SYSTOLIC BLOOD PRESSURE: 119 MMHG | BODY MASS INDEX: 32.5 KG/M2 | DIASTOLIC BLOOD PRESSURE: 72 MMHG | WEIGHT: 227 LBS | HEART RATE: 72 BPM | HEIGHT: 70 IN

## 2023-11-20 DIAGNOSIS — R42 DIZZINESS: ICD-10-CM

## 2023-11-20 DIAGNOSIS — G43.711 CHRONIC MIGRAINE WITHOUT AURA, WITH INTRACTABLE MIGRAINE, SO STATED, WITH STATUS MIGRAINOSUS: Primary | ICD-10-CM

## 2023-11-20 PROCEDURE — 99214 OFFICE O/P EST MOD 30 MIN: CPT | Performed by: PSYCHIATRY & NEUROLOGY

## 2023-11-20 PROCEDURE — G8427 DOCREV CUR MEDS BY ELIG CLIN: HCPCS | Performed by: PSYCHIATRY & NEUROLOGY

## 2023-11-20 PROCEDURE — G8417 CALC BMI ABV UP PARAM F/U: HCPCS | Performed by: PSYCHIATRY & NEUROLOGY

## 2023-11-20 PROCEDURE — G8484 FLU IMMUNIZE NO ADMIN: HCPCS | Performed by: PSYCHIATRY & NEUROLOGY

## 2023-11-20 PROCEDURE — 1036F TOBACCO NON-USER: CPT | Performed by: PSYCHIATRY & NEUROLOGY

## 2023-11-20 RX ORDER — AMOXICILLIN AND CLAVULANATE POTASSIUM 875; 125 MG/1; MG/1
TABLET, FILM COATED ORAL
COMMUNITY
Start: 2023-11-16

## 2023-11-20 RX ORDER — ERGOCALCIFEROL 1.25 MG/1
50000 CAPSULE ORAL WEEKLY
COMMUNITY
Start: 2023-10-26

## 2023-11-20 RX ORDER — NAPROXEN SODIUM 220 MG
220 TABLET ORAL DAILY PRN
COMMUNITY

## 2023-11-20 NOTE — PROGRESS NOTES
[facility]  [address] [facility], [address] [place] [address]. [address]  Ph: [phone] or [phone]  FAX: [phone]    Chief Complaint: Dizziness and Headaches     Dear Catherine Madden, MD     I had the pleasure of seeing your patient today in neurology consultation for her symptoms. As you would recall Nohemi Roper is a 37 y.o. female. Patient reports to the office following a 05/26/2021 MRI scan of the brain that showed scattered FLAIR signal abnormalities. This MRI was completed after the patient experienced dizziness, brain fog, and headaches. Compared to a previous MRI scan in 2006, there are a greater number of FLAIR abnormalities. Patient reports a history of headaches. She admits to 3-4 headaches weekly but has never taken a prophylactic medication. She does take Tylenol, however. She admits to intermitte tingling of the hands and feet. Additionally, the patient admits to the occasional soreness on her breastbone. Other reported symptoms include blue flashes, arthritis pain, fatigue, ringing in the ears, and a facial rash that is sensitive to the sun. The patient reports a family history of lupus. Upon onset of dizziness, patient feels she may pass out and admits to feeling off balance. She has noticed headaches tend to occur in the morning upon waking. Admits headaches are due to light sensitivity at times. Patient admits to having headache-free days. On days she has headaches, she reports a dull ache in the back of her head which she describes as \"annoying. \" Patient admits to pressure in the frontal portion of her brain upon onset of headaches as well. No alleviation of headaches with medication. Patient's dizziness is associated with fatigue throughout the day. Her dizziness is worse in the evening. Denies having trouble falling asleep at nighttime but admits to waking in the middle of the night.        The patient did not start amitriptyline as she has had trouble with

## 2024-01-10 ENCOUNTER — HOSPITAL ENCOUNTER (OUTPATIENT)
Dept: CT IMAGING | Age: 44
Discharge: HOME | End: 2024-01-12
Attending: OTOLARYNGOLOGY
Payer: COMMERCIAL

## 2024-01-10 DIAGNOSIS — J01.01 ACUTE RECURRENT MAXILLARY SINUSITIS: ICD-10-CM

## 2024-01-10 PROCEDURE — 70486 CT MAXILLOFACIAL W/O DYE: CPT

## 2024-06-11 RX ORDER — AMITRIPTYLINE HYDROCHLORIDE 25 MG/1
TABLET, FILM COATED ORAL
Qty: 90 TABLET | Refills: 1 | Status: SHIPPED | OUTPATIENT
Start: 2024-06-11

## 2024-06-11 NOTE — TELEPHONE ENCOUNTER
Pharmacy requesting refill of amitriptyline 25mg.      Medication active on med list yes      Date of last Rx: 5/9/2023 with 3 refills          verified by ANNIKA RUTHERFORD      Date of last appointment 11/20/2023    Next Visit Date:  Visit date not found

## 2024-07-19 ENCOUNTER — TELEPHONE (OUTPATIENT)
Dept: NEUROLOGY | Age: 44
End: 2024-07-19

## 2024-07-19 NOTE — TELEPHONE ENCOUNTER
07 19 2024 called the patient times 2 (06 24 2024 and 07 12 2024 at )  to schedule follow up appointment with Dr. Laura, left message on machine both times, no response.  I mailed the patient a letter asking them to call the office back to schedule this appointment.  KS

## 2024-08-01 PROBLEM — I83.813 VARICOSE VEINS OF BOTH LOWER EXTREMITIES WITH PAIN: Status: ACTIVE | Noted: 2023-10-19

## 2024-08-01 PROBLEM — F42.9 OBSESSIVE-COMPULSIVE DISORDER: Status: ACTIVE | Noted: 2023-10-19

## 2024-08-01 PROBLEM — K21.9 GERD WITHOUT ESOPHAGITIS: Status: ACTIVE | Noted: 2023-10-19

## 2024-08-01 PROBLEM — F42.2 MIXED OBSESSIONAL THOUGHTS AND ACTS: Status: ACTIVE | Noted: 2023-10-19

## 2024-08-01 PROBLEM — J30.2 SEASONAL ALLERGIES: Status: ACTIVE | Noted: 2023-10-19

## 2024-08-02 ENCOUNTER — HOSPITAL ENCOUNTER (OUTPATIENT)
Age: 44
Setting detail: SPECIMEN
Discharge: HOME | End: 2024-08-02

## 2024-08-02 ENCOUNTER — OFFICE VISIT (OUTPATIENT)
Dept: OBGYN CLINIC | Age: 44
End: 2024-08-02
Payer: COMMERCIAL

## 2024-08-02 VITALS
HEIGHT: 70 IN | BODY MASS INDEX: 33.5 KG/M2 | DIASTOLIC BLOOD PRESSURE: 80 MMHG | SYSTOLIC BLOOD PRESSURE: 124 MMHG | WEIGHT: 234 LBS

## 2024-08-02 DIAGNOSIS — Z12.31 ENCOUNTER FOR SCREENING MAMMOGRAM FOR MALIGNANT NEOPLASM OF BREAST: ICD-10-CM

## 2024-08-02 DIAGNOSIS — Z01.419 WELL FEMALE EXAM WITH ROUTINE GYNECOLOGICAL EXAM: Primary | ICD-10-CM

## 2024-08-02 DIAGNOSIS — Z12.11 SCREENING FOR COLON CANCER: ICD-10-CM

## 2024-08-02 DIAGNOSIS — Z11.51 SPECIAL SCREENING EXAMINATION FOR HUMAN PAPILLOMAVIRUS (HPV): ICD-10-CM

## 2024-08-02 PROCEDURE — 99396 PREV VISIT EST AGE 40-64: CPT | Performed by: NURSE PRACTITIONER

## 2024-08-02 ASSESSMENT — PATIENT HEALTH QUESTIONNAIRE - PHQ9
SUM OF ALL RESPONSES TO PHQ9 QUESTIONS 1 & 2: 0
2. FEELING DOWN, DEPRESSED OR HOPELESS: NOT AT ALL
SUM OF ALL RESPONSES TO PHQ QUESTIONS 1-9: 0
1. LITTLE INTEREST OR PLEASURE IN DOING THINGS: NOT AT ALL

## 2024-08-02 NOTE — PROGRESS NOTES
History and Physical  [facility]  [facility] [place] [address]  [place], [place]  [address] [phone]   Fax [phone]  Vika Huertas  2026              43 y.o.  Chief Complaint   Patient presents with    Annual Exam       Patient's last menstrual period was 2024.             Primary Care Physician: Cris Dexter DO    The patient was seen and examined. She has no chief complaint today and is here for her annual exam.  Her bowels are regular. There are no voiding complaints. She denies any bloating.  She denies vaginal discharge and was counseled on STD's and the need for barrier contraception.     HPI : Vika Huertas is a 43 y.o. female     Annual exam  No chief complaint.  Periods occur every month, lasting 4-5 days long.    ________________________________________________________________________  OB History    Para Term  AB Living   2 2 2 0 0 2   SAB IAB Ectopic Molar Multiple Live Births   0 0 0 0 0 2      # Outcome Date GA Lbr [place]/2nd Weight Sex Delivery Anes PTL Lv   2 Term 16 38w0d  3.79 kg (8 lb 5.7 oz) F CS-LTranv Spinal N JOSSUE      Name: ATUL HUERTAS      Apgar1: 8  Apgar5: 9   1 Term 11 39w2d  3.204 kg (7 lb 1 oz) F Vag-Spont EPI  JOSSUE      Name: Julissa     Past Medical History:   Diagnosis Date    GERD without esophagitis 10/19/2023    History of low transverse  section     Migraine     Mononucleosis     at age of 17    Postpartum depression     Varicose veins     Varicose veins of both lower extremities with pain 10/19/2023    Vitamin D deficiency                                                                    Past Surgical History:   Procedure Laterality Date     SECTION  2016    SEPTOPLASTY      WISDOM TOOTH EXTRACTION       Family History   Problem Relation Age of Onset    Heart Attack Paternal Grandfather         MI    Lupus Paternal Grandmother     Osteoporosis Paternal Grandmother

## 2024-08-12 LAB — CYTOLOGY REPORT: NORMAL

## 2024-09-09 LAB — NONINV COLON CA DNA+OCC BLD SCRN STL QL: NEGATIVE

## 2024-11-27 ENCOUNTER — HOSPITAL ENCOUNTER (OUTPATIENT)
Dept: WOMENS IMAGING | Age: 44
Discharge: HOME | End: 2024-11-29
Payer: COMMERCIAL

## 2024-11-27 VITALS — WEIGHT: 234 LBS | BODY MASS INDEX: 34.66 KG/M2 | HEIGHT: 69 IN

## 2024-11-27 DIAGNOSIS — Z12.31 ENCOUNTER FOR SCREENING MAMMOGRAM FOR MALIGNANT NEOPLASM OF BREAST: ICD-10-CM

## 2024-11-27 PROCEDURE — 77063 BREAST TOMOSYNTHESIS BI: CPT

## 2025-08-06 ENCOUNTER — OFFICE VISIT (OUTPATIENT)
Dept: OBGYN CLINIC | Age: 45
End: 2025-08-06
Payer: COMMERCIAL

## 2025-08-06 VITALS
DIASTOLIC BLOOD PRESSURE: 78 MMHG | BODY MASS INDEX: 33.18 KG/M2 | HEIGHT: 69 IN | SYSTOLIC BLOOD PRESSURE: 114 MMHG | WEIGHT: 224 LBS

## 2025-08-06 DIAGNOSIS — Z12.31 ENCOUNTER FOR SCREENING MAMMOGRAM FOR MALIGNANT NEOPLASM OF BREAST: ICD-10-CM

## 2025-08-06 DIAGNOSIS — Z01.419 WELL FEMALE EXAM WITH ROUTINE GYNECOLOGICAL EXAM: Primary | ICD-10-CM

## 2025-08-06 PROCEDURE — 99396 PREV VISIT EST AGE 40-64: CPT | Performed by: NURSE PRACTITIONER
